# Patient Record
Sex: MALE | Race: WHITE | NOT HISPANIC OR LATINO | Employment: OTHER | URBAN - METROPOLITAN AREA
[De-identification: names, ages, dates, MRNs, and addresses within clinical notes are randomized per-mention and may not be internally consistent; named-entity substitution may affect disease eponyms.]

---

## 2018-06-14 ENCOUNTER — APPOINTMENT (OUTPATIENT)
Dept: MRI IMAGING | Facility: HOSPITAL | Age: 73
End: 2018-06-14

## 2018-06-14 ENCOUNTER — APPOINTMENT (OUTPATIENT)
Dept: CARDIOLOGY | Facility: HOSPITAL | Age: 73
End: 2018-06-14
Attending: INTERNAL MEDICINE

## 2018-06-14 ENCOUNTER — HOSPITAL ENCOUNTER (INPATIENT)
Facility: HOSPITAL | Age: 73
LOS: 3 days | Discharge: HOME | End: 2018-06-17
Attending: EMERGENCY MEDICINE | Admitting: INTERNAL MEDICINE

## 2018-06-14 ENCOUNTER — APPOINTMENT (OUTPATIENT)
Dept: CT IMAGING | Facility: HOSPITAL | Age: 73
End: 2018-06-14

## 2018-06-14 DIAGNOSIS — R77.8 ELEVATED TROPONIN: ICD-10-CM

## 2018-06-14 DIAGNOSIS — G45.9 TRANSIENT CEREBRAL ISCHEMIA, UNSPECIFIED TYPE: Primary | ICD-10-CM

## 2018-06-14 PROBLEM — I10 ESSENTIAL HYPERTENSION: Status: ACTIVE | Noted: 2018-06-14

## 2018-06-14 PROBLEM — I50.9 CHRONIC HEART FAILURE (HCC): Status: ACTIVE | Noted: 2018-06-14

## 2018-06-14 PROBLEM — R60.0 LOCALIZED EDEMA: Status: ACTIVE | Noted: 2018-06-14

## 2018-06-14 LAB
ALBUMIN SERPL-MCNC: 4.5 G/DL (ref 3.5–5.2)
ALBUMIN/GLOB SERPL: 2 G/DL
ALP SERPL-CCNC: 80 U/L (ref 39–117)
ALT SERPL W P-5'-P-CCNC: 16 U/L (ref 1–41)
ANION GAP SERPL CALCULATED.3IONS-SCNC: 12.7 MMOL/L
AORTIC DIMENSIONLESS INDEX: 0.8 (DI)
AST SERPL-CCNC: 15 U/L (ref 1–40)
BACTERIA UR QL AUTO: NORMAL /HPF
BASOPHILS # BLD AUTO: 0.02 10*3/MM3 (ref 0–0.2)
BASOPHILS NFR BLD AUTO: 0.3 % (ref 0–1.5)
BH CV ECHO MEAS - ACS: 2.2 CM
BH CV ECHO MEAS - AO MAX PG (FULL): 10.5 MMHG
BH CV ECHO MEAS - AO MAX PG: 22.5 MMHG
BH CV ECHO MEAS - AO MEAN PG (FULL): 2 MMHG
BH CV ECHO MEAS - AO MEAN PG: 8 MMHG
BH CV ECHO MEAS - AO ROOT AREA (BSA CORRECTED): 1.6
BH CV ECHO MEAS - AO ROOT AREA: 10.8 CM^2
BH CV ECHO MEAS - AO ROOT DIAM: 3.7 CM
BH CV ECHO MEAS - AO V2 MAX: 237 CM/SEC
BH CV ECHO MEAS - AO V2 MEAN: 122 CM/SEC
BH CV ECHO MEAS - AO V2 VTI: 39.4 CM
BH CV ECHO MEAS - AVA(I,A): 2.8 CM^2
BH CV ECHO MEAS - AVA(I,D): 2.8 CM^2
BH CV ECHO MEAS - AVA(V,A): 2.5 CM^2
BH CV ECHO MEAS - AVA(V,D): 2.5 CM^2
BH CV ECHO MEAS - BSA(HAYCOCK): 2.5 M^2
BH CV ECHO MEAS - BSA: 2.4 M^2
BH CV ECHO MEAS - BZI_BMI: 42.4 KILOGRAMS/M^2
BH CV ECHO MEAS - BZI_METRIC_HEIGHT: 172.7 CM
BH CV ECHO MEAS - BZI_METRIC_WEIGHT: 126.6 KG
BH CV ECHO MEAS - CONTRAST EF (2CH): 72.6 ML/M^2
BH CV ECHO MEAS - CONTRAST EF 4CH: 73.7 ML/M^2
BH CV ECHO MEAS - EDV(CUBED): 195.1 ML
BH CV ECHO MEAS - EDV(MOD-SP2): 135 ML
BH CV ECHO MEAS - EDV(MOD-SP4): 99 ML
BH CV ECHO MEAS - EDV(TEICH): 166.6 ML
BH CV ECHO MEAS - EF(CUBED): 78 %
BH CV ECHO MEAS - EF(MOD-BP): 72 %
BH CV ECHO MEAS - EF(MOD-SP2): 72.6 %
BH CV ECHO MEAS - EF(MOD-SP4): 73.7 %
BH CV ECHO MEAS - EF(TEICH): 69.5 %
BH CV ECHO MEAS - ESV(CUBED): 42.9 ML
BH CV ECHO MEAS - ESV(MOD-SP2): 37 ML
BH CV ECHO MEAS - ESV(MOD-SP4): 26 ML
BH CV ECHO MEAS - ESV(TEICH): 50.9 ML
BH CV ECHO MEAS - FS: 39.7 %
BH CV ECHO MEAS - IVS/LVPW: 1
BH CV ECHO MEAS - IVSD: 1.4 CM
BH CV ECHO MEAS - LAT PEAK E' VEL: 7 CM/SEC
BH CV ECHO MEAS - LV DIASTOLIC VOL/BSA (35-75): 42 ML/M^2
BH CV ECHO MEAS - LV MASS(C)D: 367.5 GRAMS
BH CV ECHO MEAS - LV MASS(C)DI: 156.1 GRAMS/M^2
BH CV ECHO MEAS - LV MAX PG: 12 MMHG
BH CV ECHO MEAS - LV MEAN PG: 6 MMHG
BH CV ECHO MEAS - LV SYSTOLIC VOL/BSA (12-30): 11 ML/M^2
BH CV ECHO MEAS - LV V1 MAX: 173 CM/SEC
BH CV ECHO MEAS - LV V1 MEAN: 109 CM/SEC
BH CV ECHO MEAS - LV V1 VTI: 32.4 CM
BH CV ECHO MEAS - LVIDD: 5.8 CM
BH CV ECHO MEAS - LVIDS: 3.5 CM
BH CV ECHO MEAS - LVLD AP2: 9.7 CM
BH CV ECHO MEAS - LVLD AP4: 8 CM
BH CV ECHO MEAS - LVLS AP2: 8 CM
BH CV ECHO MEAS - LVLS AP4: 6.3 CM
BH CV ECHO MEAS - LVOT AREA (M): 3.5 CM^2
BH CV ECHO MEAS - LVOT AREA: 3.5 CM^2
BH CV ECHO MEAS - LVOT DIAM: 2.1 CM
BH CV ECHO MEAS - LVPWD: 1.4 CM
BH CV ECHO MEAS - MED PEAK E' VEL: 5 CM/SEC
BH CV ECHO MEAS - MV A DUR: 0.18 SEC
BH CV ECHO MEAS - MV A MAX VEL: 118 CM/SEC
BH CV ECHO MEAS - MV DEC SLOPE: 274 CM/SEC^2
BH CV ECHO MEAS - MV DEC TIME: 0.28 SEC
BH CV ECHO MEAS - MV E MAX VEL: 62.6 CM/SEC
BH CV ECHO MEAS - MV E/A: 0.53
BH CV ECHO MEAS - MV MAX PG: 7 MMHG
BH CV ECHO MEAS - MV MEAN PG: 2 MMHG
BH CV ECHO MEAS - MV P1/2T MAX VEL: 93.7 CM/SEC
BH CV ECHO MEAS - MV P1/2T: 100.2 MSEC
BH CV ECHO MEAS - MV V2 MAX: 132 CM/SEC
BH CV ECHO MEAS - MV V2 MEAN: 64.7 CM/SEC
BH CV ECHO MEAS - MV V2 VTI: 39.6 CM
BH CV ECHO MEAS - MVA P1/2T LCG: 2.3 CM^2
BH CV ECHO MEAS - MVA(P1/2T): 2.2 CM^2
BH CV ECHO MEAS - MVA(VTI): 2.8 CM^2
BH CV ECHO MEAS - PA ACC TIME: 0.13 SEC
BH CV ECHO MEAS - PA MAX PG (FULL): 0.07 MMHG
BH CV ECHO MEAS - PA MAX PG: 3 MMHG
BH CV ECHO MEAS - PA PR(ACCEL): 20.5 MMHG
BH CV ECHO MEAS - PA V2 MAX: 86.9 CM/SEC
BH CV ECHO MEAS - PVA(V,A): 2.8 CM^2
BH CV ECHO MEAS - PVA(V,D): 2.8 CM^2
BH CV ECHO MEAS - QP/QS: 0.52
BH CV ECHO MEAS - RAP SYSTOLE: 8 MMHG
BH CV ECHO MEAS - RV MAX PG: 3 MMHG
BH CV ECHO MEAS - RV MEAN PG: 1 MMHG
BH CV ECHO MEAS - RV V1 MAX: 85.9 CM/SEC
BH CV ECHO MEAS - RV V1 MEAN: 55.8 CM/SEC
BH CV ECHO MEAS - RV V1 VTI: 20.5 CM
BH CV ECHO MEAS - RVOT AREA: 2.8 CM^2
BH CV ECHO MEAS - RVOT DIAM: 1.9 CM
BH CV ECHO MEAS - RVSP: 38 MMHG
BH CV ECHO MEAS - SI(AO): 179.9 ML/M^2
BH CV ECHO MEAS - SI(CUBED): 64.7 ML/M^2
BH CV ECHO MEAS - SI(LVOT): 47.7 ML/M^2
BH CV ECHO MEAS - SI(MOD-SP2): 41.6 ML/M^2
BH CV ECHO MEAS - SI(MOD-SP4): 31 ML/M^2
BH CV ECHO MEAS - SI(TEICH): 49.1 ML/M^2
BH CV ECHO MEAS - SV(AO): 423.6 ML
BH CV ECHO MEAS - SV(CUBED): 152.2 ML
BH CV ECHO MEAS - SV(LVOT): 112.2 ML
BH CV ECHO MEAS - SV(MOD-SP2): 98 ML
BH CV ECHO MEAS - SV(MOD-SP4): 73 ML
BH CV ECHO MEAS - SV(RVOT): 58.1 ML
BH CV ECHO MEAS - SV(TEICH): 115.7 ML
BH CV ECHO MEAS - TAPSE (>1.6): 2.2 CM2
BH CV ECHO MEAS - TR MAX VEL: 273 CM/SEC
BH CV ECHO MEASUREMENTS AVERAGE E/E' RATIO: 10.43
BH CV LOW VAS LEFT SAPHENOFEMORAL JUNCTION SPONT: 1
BH CV LOW VAS RIGHT MID FEMORAL SPONT: 1
BH CV LOW VAS RIGHT POPLITEAL SPONT: 1
BH CV LOWER VASCULAR LEFT COMMON FEMORAL AUGMENT: NORMAL
BH CV LOWER VASCULAR LEFT COMMON FEMORAL COMPETENT: NORMAL
BH CV LOWER VASCULAR LEFT COMMON FEMORAL COMPRESS: NORMAL
BH CV LOWER VASCULAR LEFT COMMON FEMORAL PHASIC: NORMAL
BH CV LOWER VASCULAR LEFT COMMON FEMORAL SPONT: NORMAL
BH CV LOWER VASCULAR LEFT DISTAL FEMORAL COMPRESS: NORMAL
BH CV LOWER VASCULAR LEFT GASTRONEMIUS COMPRESS: NORMAL
BH CV LOWER VASCULAR LEFT GREATER SAPH AK COMPRESS: NORMAL
BH CV LOWER VASCULAR LEFT GREATER SAPH BK COMPRESS: NORMAL
BH CV LOWER VASCULAR LEFT LESSER SAPH COMPRESS: NORMAL
BH CV LOWER VASCULAR LEFT MID FEMORAL AUGMENT: NORMAL
BH CV LOWER VASCULAR LEFT MID FEMORAL COMPETENT: NORMAL
BH CV LOWER VASCULAR LEFT MID FEMORAL COMPRESS: NORMAL
BH CV LOWER VASCULAR LEFT MID FEMORAL PHASIC: NORMAL
BH CV LOWER VASCULAR LEFT MID FEMORAL SPONT: NORMAL
BH CV LOWER VASCULAR LEFT PERONEAL COMPRESS: NORMAL
BH CV LOWER VASCULAR LEFT POPLITEAL AUGMENT: NORMAL
BH CV LOWER VASCULAR LEFT POPLITEAL COMPETENT: NORMAL
BH CV LOWER VASCULAR LEFT POPLITEAL COMPRESS: NORMAL
BH CV LOWER VASCULAR LEFT POPLITEAL PHASIC: NORMAL
BH CV LOWER VASCULAR LEFT POPLITEAL SPONT: NORMAL
BH CV LOWER VASCULAR LEFT POSTERIOR TIBIAL COMPRESS: NORMAL
BH CV LOWER VASCULAR LEFT PROXIMAL FEMORAL COMPRESS: NORMAL
BH CV LOWER VASCULAR LEFT SAPHENOFEMORAL JUNCTION AUGMENT: NORMAL
BH CV LOWER VASCULAR LEFT SAPHENOFEMORAL JUNCTION COMPETENT: NORMAL
BH CV LOWER VASCULAR LEFT SAPHENOFEMORAL JUNCTION COMPRESS: NORMAL
BH CV LOWER VASCULAR LEFT SAPHENOFEMORAL JUNCTION PHASIC: NORMAL
BH CV LOWER VASCULAR LEFT SAPHENOFEMORAL JUNCTION SPONT: NORMAL
BH CV LOWER VASCULAR RIGHT COMMON FEMORAL AUGMENT: NORMAL
BH CV LOWER VASCULAR RIGHT COMMON FEMORAL COMPETENT: NORMAL
BH CV LOWER VASCULAR RIGHT COMMON FEMORAL COMPRESS: NORMAL
BH CV LOWER VASCULAR RIGHT COMMON FEMORAL PHASIC: NORMAL
BH CV LOWER VASCULAR RIGHT COMMON FEMORAL SPONT: NORMAL
BH CV LOWER VASCULAR RIGHT DISTAL FEMORAL COMPRESS: NORMAL
BH CV LOWER VASCULAR RIGHT GASTRONEMIUS COMPRESS: NORMAL
BH CV LOWER VASCULAR RIGHT GREATER SAPH AK COMPRESS: NORMAL
BH CV LOWER VASCULAR RIGHT GREATER SAPH BK COMPRESS: NORMAL
BH CV LOWER VASCULAR RIGHT LESSER SAPH COMPRESS: NORMAL
BH CV LOWER VASCULAR RIGHT MID FEMORAL AUGMENT: NORMAL
BH CV LOWER VASCULAR RIGHT MID FEMORAL COMPETENT: NORMAL
BH CV LOWER VASCULAR RIGHT MID FEMORAL COMPRESS: NORMAL
BH CV LOWER VASCULAR RIGHT MID FEMORAL PHASIC: NORMAL
BH CV LOWER VASCULAR RIGHT MID FEMORAL SPONT: NORMAL
BH CV LOWER VASCULAR RIGHT PERONEAL COMPRESS: NORMAL
BH CV LOWER VASCULAR RIGHT POPLITEAL AUGMENT: NORMAL
BH CV LOWER VASCULAR RIGHT POPLITEAL COMPETENT: NORMAL
BH CV LOWER VASCULAR RIGHT POPLITEAL COMPRESS: NORMAL
BH CV LOWER VASCULAR RIGHT POPLITEAL PHASIC: NORMAL
BH CV LOWER VASCULAR RIGHT POPLITEAL SPONT: NORMAL
BH CV LOWER VASCULAR RIGHT POSTERIOR TIBIAL COMPRESS: NORMAL
BH CV LOWER VASCULAR RIGHT PROXIMAL FEMORAL COMPRESS: NORMAL
BH CV LOWER VASCULAR RIGHT SAPHENOFEMORAL JUNCTION AUGMENT: NORMAL
BH CV LOWER VASCULAR RIGHT SAPHENOFEMORAL JUNCTION COMPETENT: NORMAL
BH CV LOWER VASCULAR RIGHT SAPHENOFEMORAL JUNCTION COMPRESS: NORMAL
BH CV LOWER VASCULAR RIGHT SAPHENOFEMORAL JUNCTION PHASIC: NORMAL
BH CV LOWER VASCULAR RIGHT SAPHENOFEMORAL JUNCTION SPONT: NORMAL
BH CV VAS BP RIGHT ARM: NORMAL MMHG
BH CV XLRA - RV BASE: 3.8 CM
BH CV XLRA - TDI S': 17 CM/SEC
BILIRUB SERPL-MCNC: 0.3 MG/DL (ref 0.1–1.2)
BILIRUB UR QL STRIP: NEGATIVE
BUN BLD-MCNC: 16 MG/DL (ref 8–23)
BUN/CREAT SERPL: 13.4 (ref 7–25)
CALCIUM SPEC-SCNC: 9.3 MG/DL (ref 8.6–10.5)
CHLORIDE SERPL-SCNC: 102 MMOL/L (ref 98–107)
CLARITY UR: CLEAR
CO2 SERPL-SCNC: 28.3 MMOL/L (ref 22–29)
COLOR UR: YELLOW
CREAT BLD-MCNC: 1.19 MG/DL (ref 0.76–1.27)
DEPRECATED RDW RBC AUTO: 52.5 FL (ref 37–54)
EOSINOPHIL # BLD AUTO: 0.29 10*3/MM3 (ref 0–0.7)
EOSINOPHIL NFR BLD AUTO: 4.2 % (ref 0.3–6.2)
ERYTHROCYTE [DISTWIDTH] IN BLOOD BY AUTOMATED COUNT: 15.1 % (ref 11.5–14.5)
GFR SERPL CREATININE-BSD FRML MDRD: 60 ML/MIN/1.73
GLOBULIN UR ELPH-MCNC: 2.2 GM/DL
GLUCOSE BLD-MCNC: 107 MG/DL (ref 65–99)
GLUCOSE BLDC GLUCOMTR-MCNC: 96 MG/DL (ref 70–130)
GLUCOSE UR STRIP-MCNC: NEGATIVE MG/DL
HCT VFR BLD AUTO: 49.3 % (ref 40.4–52.2)
HGB BLD-MCNC: 16.2 G/DL (ref 13.7–17.6)
HGB UR QL STRIP.AUTO: NEGATIVE
HYALINE CASTS UR QL AUTO: NORMAL /LPF
IMM GRANULOCYTES # BLD: 0.02 10*3/MM3 (ref 0–0.03)
IMM GRANULOCYTES NFR BLD: 0.3 % (ref 0–0.5)
INR PPP: 0.86 (ref 0.9–1.1)
KETONES UR QL STRIP: NEGATIVE
LEFT ATRIUM VOLUME INDEX: 30.9 ML/M2
LEUKOCYTE ESTERASE UR QL STRIP.AUTO: NEGATIVE
LIPASE SERPL-CCNC: 46 U/L (ref 13–60)
LYMPHOCYTES # BLD AUTO: 1.66 10*3/MM3 (ref 0.9–4.8)
LYMPHOCYTES NFR BLD AUTO: 23.9 % (ref 19.6–45.3)
MAXIMAL PREDICTED HEART RATE: 150 BPM
MCH RBC QN AUTO: 31.2 PG (ref 27–32.7)
MCHC RBC AUTO-ENTMCNC: 32.9 G/DL (ref 32.6–36.4)
MCV RBC AUTO: 94.8 FL (ref 79.8–96.2)
MONOCYTES # BLD AUTO: 0.54 10*3/MM3 (ref 0.2–1.2)
MONOCYTES NFR BLD AUTO: 7.8 % (ref 5–12)
NEUTROPHILS # BLD AUTO: 4.41 10*3/MM3 (ref 1.9–8.1)
NEUTROPHILS NFR BLD AUTO: 63.5 % (ref 42.7–76)
NITRITE UR QL STRIP: NEGATIVE
PH UR STRIP.AUTO: 7 [PH] (ref 5–8)
PLATELET # BLD AUTO: 177 10*3/MM3 (ref 140–500)
PMV BLD AUTO: 10.3 FL (ref 6–12)
POTASSIUM BLD-SCNC: 3.9 MMOL/L (ref 3.5–5.2)
PROT SERPL-MCNC: 6.7 G/DL (ref 6–8.5)
PROT UR QL STRIP: ABNORMAL
PROTHROMBIN TIME: 11.6 SECONDS (ref 11.7–14.2)
RBC # BLD AUTO: 5.2 10*6/MM3 (ref 4.6–6)
RBC # UR: NORMAL /HPF
REF LAB TEST METHOD: NORMAL
SODIUM BLD-SCNC: 143 MMOL/L (ref 136–145)
SP GR UR STRIP: 1.02 (ref 1–1.03)
SQUAMOUS #/AREA URNS HPF: NORMAL /HPF
STRESS TARGET HR: 128 BPM
TROPONIN T SERPL-MCNC: 0.03 NG/ML (ref 0–0.03)
TROPONIN T SERPL-MCNC: 0.04 NG/ML (ref 0–0.03)
UROBILINOGEN UR QL STRIP: ABNORMAL
WBC NRBC COR # BLD: 6.94 10*3/MM3 (ref 4.5–10.7)
WBC UR QL AUTO: NORMAL /HPF

## 2018-06-14 PROCEDURE — 80053 COMPREHEN METABOLIC PANEL: CPT | Performed by: EMERGENCY MEDICINE

## 2018-06-14 PROCEDURE — 70544 MR ANGIOGRAPHY HEAD W/O DYE: CPT

## 2018-06-14 PROCEDURE — 93970 EXTREMITY STUDY: CPT

## 2018-06-14 PROCEDURE — A9577 INJ MULTIHANCE: HCPCS | Performed by: INTERNAL MEDICINE

## 2018-06-14 PROCEDURE — 84484 ASSAY OF TROPONIN QUANT: CPT | Performed by: EMERGENCY MEDICINE

## 2018-06-14 PROCEDURE — 84484 ASSAY OF TROPONIN QUANT: CPT | Performed by: INTERNAL MEDICINE

## 2018-06-14 PROCEDURE — 99285 EMERGENCY DEPT VISIT HI MDM: CPT

## 2018-06-14 PROCEDURE — 70553 MRI BRAIN STEM W/O & W/DYE: CPT

## 2018-06-14 PROCEDURE — 70450 CT HEAD/BRAIN W/O DYE: CPT

## 2018-06-14 PROCEDURE — 85610 PROTHROMBIN TIME: CPT | Performed by: EMERGENCY MEDICINE

## 2018-06-14 PROCEDURE — 81001 URINALYSIS AUTO W/SCOPE: CPT | Performed by: EMERGENCY MEDICINE

## 2018-06-14 PROCEDURE — 93306 TTE W/DOPPLER COMPLETE: CPT

## 2018-06-14 PROCEDURE — 0 GADOBENATE DIMEGLUMINE 529 MG/ML SOLUTION: Performed by: INTERNAL MEDICINE

## 2018-06-14 PROCEDURE — 93010 ELECTROCARDIOGRAM REPORT: CPT | Performed by: INTERNAL MEDICINE

## 2018-06-14 PROCEDURE — 93306 TTE W/DOPPLER COMPLETE: CPT | Performed by: INTERNAL MEDICINE

## 2018-06-14 PROCEDURE — 93005 ELECTROCARDIOGRAM TRACING: CPT | Performed by: EMERGENCY MEDICINE

## 2018-06-14 PROCEDURE — 82962 GLUCOSE BLOOD TEST: CPT

## 2018-06-14 PROCEDURE — 25010000002 PERFLUTREN (DEFINITY) 8.476 MG IN SODIUM CHLORIDE 0.9 % 10 ML INJECTION: Performed by: INTERNAL MEDICINE

## 2018-06-14 PROCEDURE — 85025 COMPLETE CBC W/AUTO DIFF WBC: CPT | Performed by: EMERGENCY MEDICINE

## 2018-06-14 PROCEDURE — 70549 MR ANGIOGRAPH NECK W/O&W/DYE: CPT

## 2018-06-14 PROCEDURE — 83690 ASSAY OF LIPASE: CPT | Performed by: EMERGENCY MEDICINE

## 2018-06-14 RX ORDER — ONDANSETRON 2 MG/ML
4 INJECTION INTRAMUSCULAR; INTRAVENOUS EVERY 6 HOURS PRN
Status: DISCONTINUED | OUTPATIENT
Start: 2018-06-14 | End: 2018-06-17 | Stop reason: HOSPADM

## 2018-06-14 RX ORDER — ALBUTEROL SULFATE 2.5 MG/3ML
2.5 SOLUTION RESPIRATORY (INHALATION) EVERY 6 HOURS PRN
Status: DISCONTINUED | OUTPATIENT
Start: 2018-06-14 | End: 2018-06-17 | Stop reason: HOSPADM

## 2018-06-14 RX ORDER — FUROSEMIDE 40 MG/1
40 TABLET ORAL 2 TIMES DAILY
COMMUNITY

## 2018-06-14 RX ORDER — TRAZODONE HYDROCHLORIDE 50 MG/1
50 TABLET ORAL NIGHTLY
Status: DISCONTINUED | OUTPATIENT
Start: 2018-06-14 | End: 2018-06-17 | Stop reason: HOSPADM

## 2018-06-14 RX ORDER — ALUMINA, MAGNESIA, AND SIMETHICONE 2400; 2400; 240 MG/30ML; MG/30ML; MG/30ML
7.5 SUSPENSION ORAL EVERY 4 HOURS PRN
Status: DISCONTINUED | OUTPATIENT
Start: 2018-06-14 | End: 2018-06-17 | Stop reason: HOSPADM

## 2018-06-14 RX ORDER — ALPRAZOLAM 0.5 MG/1
0.5 TABLET ORAL NIGHTLY PRN
Status: DISCONTINUED | OUTPATIENT
Start: 2018-06-14 | End: 2018-06-17 | Stop reason: HOSPADM

## 2018-06-14 RX ORDER — ASPIRIN 325 MG
325 TABLET ORAL DAILY
Status: DISCONTINUED | OUTPATIENT
Start: 2018-06-14 | End: 2018-06-15

## 2018-06-14 RX ORDER — ASPIRIN 300 MG/1
300 SUPPOSITORY RECTAL DAILY
Status: DISCONTINUED | OUTPATIENT
Start: 2018-06-14 | End: 2018-06-15

## 2018-06-14 RX ORDER — LEVOTHYROXINE SODIUM 0.15 MG/1
300 TABLET ORAL DAILY
Status: DISCONTINUED | OUTPATIENT
Start: 2018-06-14 | End: 2018-06-17 | Stop reason: HOSPADM

## 2018-06-14 RX ORDER — ATORVASTATIN CALCIUM 80 MG/1
80 TABLET, FILM COATED ORAL NIGHTLY
Status: DISCONTINUED | OUTPATIENT
Start: 2018-06-14 | End: 2018-06-15

## 2018-06-14 RX ORDER — HYDRALAZINE HYDROCHLORIDE 100 MG/1
100 TABLET, FILM COATED ORAL 2 TIMES DAILY
COMMUNITY

## 2018-06-14 RX ORDER — LABETALOL HYDROCHLORIDE 5 MG/ML
20 INJECTION, SOLUTION INTRAVENOUS EVERY 6 HOURS PRN
Status: DISCONTINUED | OUTPATIENT
Start: 2018-06-14 | End: 2018-06-17 | Stop reason: HOSPADM

## 2018-06-14 RX ORDER — BISACODYL 10 MG
10 SUPPOSITORY, RECTAL RECTAL DAILY PRN
Status: DISCONTINUED | OUTPATIENT
Start: 2018-06-14 | End: 2018-06-17 | Stop reason: HOSPADM

## 2018-06-14 RX ORDER — LEVOTHYROXINE SODIUM 300 UG/1
300 TABLET ORAL DAILY
COMMUNITY

## 2018-06-14 RX ORDER — AMLODIPINE BESYLATE 5 MG/1
5 TABLET ORAL DAILY
COMMUNITY

## 2018-06-14 RX ORDER — SODIUM CHLORIDE 0.9 % (FLUSH) 0.9 %
10 SYRINGE (ML) INJECTION AS NEEDED
Status: DISCONTINUED | OUTPATIENT
Start: 2018-06-14 | End: 2018-06-17 | Stop reason: HOSPADM

## 2018-06-14 RX ORDER — CARVEDILOL 3.12 MG/1
3.12 TABLET ORAL 2 TIMES DAILY WITH MEALS
Status: DISCONTINUED | OUTPATIENT
Start: 2018-06-14 | End: 2018-06-16

## 2018-06-14 RX ORDER — DOCUSATE SODIUM 100 MG/1
100 CAPSULE, LIQUID FILLED ORAL 2 TIMES DAILY PRN
Status: DISCONTINUED | OUTPATIENT
Start: 2018-06-14 | End: 2018-06-17 | Stop reason: HOSPADM

## 2018-06-14 RX ORDER — ALBUTEROL SULFATE 90 UG/1
2 AEROSOL, METERED RESPIRATORY (INHALATION) EVERY 4 HOURS PRN
COMMUNITY

## 2018-06-14 RX ORDER — LOSARTAN POTASSIUM 100 MG/1
100 TABLET ORAL DAILY
COMMUNITY

## 2018-06-14 RX ORDER — SODIUM CHLORIDE 0.9 % (FLUSH) 0.9 %
1-10 SYRINGE (ML) INJECTION AS NEEDED
Status: DISCONTINUED | OUTPATIENT
Start: 2018-06-14 | End: 2018-06-17 | Stop reason: HOSPADM

## 2018-06-14 RX ORDER — TRAMADOL HYDROCHLORIDE 50 MG/1
50 TABLET ORAL EVERY 4 HOURS PRN
COMMUNITY
End: 2018-06-17 | Stop reason: HOSPADM

## 2018-06-14 RX ORDER — CARVEDILOL 3.12 MG/1
3.12 TABLET ORAL 2 TIMES DAILY WITH MEALS
COMMUNITY
End: 2018-06-17 | Stop reason: HOSPADM

## 2018-06-14 RX ORDER — ASPIRIN 81 MG/1
324 TABLET, CHEWABLE ORAL ONCE
Status: COMPLETED | OUTPATIENT
Start: 2018-06-14 | End: 2018-06-14

## 2018-06-14 RX ORDER — TRAZODONE HYDROCHLORIDE 50 MG/1
50 TABLET ORAL NIGHTLY
COMMUNITY

## 2018-06-14 RX ORDER — ALPRAZOLAM 0.5 MG/1
0.5 TABLET ORAL NIGHTLY PRN
COMMUNITY

## 2018-06-14 RX ADMIN — GADOBENATE DIMEGLUMINE 20 ML: 529 INJECTION, SOLUTION INTRAVENOUS at 21:55

## 2018-06-14 RX ADMIN — CARVEDILOL 3.12 MG: 3.12 TABLET, FILM COATED ORAL at 23:16

## 2018-06-14 RX ADMIN — ATORVASTATIN CALCIUM 80 MG: 80 TABLET, FILM COATED ORAL at 23:16

## 2018-06-14 RX ADMIN — LEVOTHYROXINE SODIUM 300 MCG: 150 TABLET ORAL at 23:16

## 2018-06-14 RX ADMIN — ASPIRIN 324 MG: 81 TABLET, CHEWABLE ORAL at 13:15

## 2018-06-14 RX ADMIN — ALPRAZOLAM 0.5 MG: 0.5 TABLET ORAL at 23:16

## 2018-06-14 RX ADMIN — TRAZODONE HYDROCHLORIDE 50 MG: 50 TABLET ORAL at 23:16

## 2018-06-14 RX ADMIN — PERFLUTREN 3 ML: 6.52 INJECTION, SUSPENSION INTRAVENOUS at 20:22

## 2018-06-14 RX ADMIN — SERTRALINE 50 MG: 50 TABLET, FILM COATED ORAL at 23:16

## 2018-06-14 NOTE — ED NOTES
Pt was ambulated with cane and assist x1. Pt felt weak but appeared stable.     Bret Chou  06/14/18 0218

## 2018-06-14 NOTE — ED PROVIDER NOTES
" EMERGENCY DEPARTMENT ENCOUNTER    CHIEF COMPLAINT  Chief Complaint: Generalized weakness  History given by: Patient  History limited by: None  Room Number: 41/41  PMD: Provider Not In System      HPI:  Pt is a 72 y.o. male who presents complaining of generalized weakness since 0800 when pt noticed \"pressure\" in his L sided head and tingling in the L arm that has since resolved. Pt also c/o mild confusion, spouse. Pt states he woke up at 0700 and was at baseline. Pt hx of MI and stroke. Pt is on Clonidine, Hydralazine, and Losartan.  Patient denies any alcohol or any illegal drugs.  Currently the patient denies any pain or shortness of breath.    Duration: Since 0800  Onset: Gradual  Timing: Constant  Quality: Generalized weakness and \"pressure\" in L sided head  Intensity/Severity: Moderate  Progression: Unchanged, but \"pressure\" in L sided head and tingling in L arm has resolved  Associated Symptoms: \"Pressure\" in L sided head, tingling in L arm, and mild confusion  Previous Episodes: Pt hx of MI and stroke.  Treatment before arrival: None    PAST MEDICAL HISTORY  Active Ambulatory Problems     Diagnosis Date Noted   • No Active Ambulatory Problems     Resolved Ambulatory Problems     Diagnosis Date Noted   • No Resolved Ambulatory Problems     Past Medical History:   Diagnosis Date   • Cancer    • CHF (congestive heart failure)    • Coronary artery disease    • Disease of thyroid gland    • Hyperlipidemia    • Hypertension    • Injury of back    • Melanoma    • Myocardial infarction    • Renal disorder    • Stroke        PAST SURGICAL HISTORY  Past Surgical History:   Procedure Laterality Date   • JOINT REPLACEMENT     • LYMPHADENECTOMY         FAMILY HISTORY  History reviewed. No pertinent family history.    SOCIAL HISTORY  Social History     Social History   • Marital status:      Spouse name: N/A   • Number of children: N/A   • Years of education: N/A     Occupational History   • Not on file.     Social " "History Main Topics   • Smoking status: Current Every Day Smoker   • Smokeless tobacco: Never Used   • Alcohol use Yes   • Drug use: No   • Sexual activity: Defer     Other Topics Concern   • Not on file     Social History Narrative   • No narrative on file       ALLERGIES  Penicillins    REVIEW OF SYSTEMS  Review of Systems   Constitutional: Negative for activity change, appetite change and fever.   HENT: Negative for congestion and sore throat.         \"Pressure\" in L sided head.   Eyes: Negative.    Respiratory: Negative for cough and shortness of breath.    Cardiovascular: Negative for chest pain and leg swelling.   Gastrointestinal: Negative for abdominal pain, diarrhea and vomiting.   Endocrine: Negative.    Genitourinary: Negative for decreased urine volume and dysuria.   Musculoskeletal: Negative for neck pain.   Skin: Negative for rash and wound.   Allergic/Immunologic: Negative.    Neurological: Positive for weakness (Generalized). Negative for numbness and headaches.        Tingling in L arm.   Hematological: Negative.    Psychiatric/Behavioral: Positive for confusion (mild).   All other systems reviewed and are negative.      PHYSICAL EXAM  ED Triage Vitals [06/14/18 0919]   Temp Heart Rate Resp BP SpO2   98.1 °F (36.7 °C) 63 18 155/93 96 %      Temp src Heart Rate Source Patient Position BP Location FiO2 (%)   Tympanic Monitor -- -- --       Physical Exam   Constitutional: He is oriented to person, place, and time. No distress.   Patient is chronically ill-appearing   HENT:   Head: Normocephalic and atraumatic.   Eyes: EOM are normal. Pupils are equal, round, and reactive to light.   Neck: Normal range of motion. Neck supple.   Cardiovascular: Normal rate, regular rhythm and normal heart sounds.    O2 97% on room air.   Pulmonary/Chest: Effort normal and breath sounds normal. No respiratory distress.   Lungs CTAB   Abdominal: Soft. There is no tenderness. There is no rebound and no guarding. "   Musculoskeletal: Normal range of motion. He exhibits no edema.   Neurological: He is alert and oriented to person, place, and time. He has normal sensation, normal strength and intact cranial nerves.   Skin: Skin is warm and dry.   Psychiatric: Mood and affect normal.   Nursing note and vitals reviewed.      LAB RESULTS  Lab Results (last 24 hours)     Procedure Component Value Units Date/Time    CBC & Differential [[id number]] Collected:  06/14/18 1047    Specimen:  Blood Updated:  06/14/18 1105    Narrative:       The following orders were created for panel order CBC & Differential.  Procedure                               Abnormality         Status                     ---------                               -----------         ------                     CBC Auto Differential[[id number]]        Abnormal            Final result                 Please view results for these tests on the individual orders.    Comprehensive Metabolic Panel [[id number]]  (Abnormal) Collected:  06/14/18 1047    Specimen:  Blood Updated:  06/14/18 1121     Glucose 107 (H) mg/dL      BUN 16 mg/dL      Creatinine 1.19 mg/dL      Sodium 143 mmol/L      Potassium 3.9 mmol/L      Chloride 102 mmol/L      CO2 28.3 mmol/L      Calcium 9.3 mg/dL      Total Protein 6.7 g/dL      Albumin 4.50 g/dL      ALT (SGPT) 16 U/L      AST (SGOT) 15 U/L      Alkaline Phosphatase 80 U/L      Total Bilirubin 0.3 mg/dL      eGFR Non African Amer 60 (L) mL/min/1.73      Globulin 2.2 gm/dL      A/G Ratio 2.0 g/dL      BUN/Creatinine Ratio 13.4     Anion Gap 12.7 mmol/L     Protime-INR [[id number]]  (Abnormal) Collected:  06/14/18 1047    Specimen:  Blood Updated:  06/14/18 1120     Protime 11.6 (L) Seconds      INR 0.86 (L)    Troponin [[id number]]  (Abnormal) Collected:  06/14/18 1047    Specimen:  Blood Updated:  06/14/18 1123     Troponin T 0.035 (H) ng/mL     Narrative:       Troponin T Reference Ranges:  Less than 0.03 ng/mL:    Negative for AMI  0.03 to 0.09  ng/mL:      Indeterminant for AMI  Greater than 0.09 ng/mL: Positive for AMI    Lipase [[id number]]  (Normal) Collected:  06/14/18 1047    Specimen:  Blood Updated:  06/14/18 1121     Lipase 46 U/L     CBC Auto Differential [[id number]]  (Abnormal) Collected:  06/14/18 1047    Specimen:  Blood Updated:  06/14/18 1105     WBC 6.94 10*3/mm3      RBC 5.20 10*6/mm3      Hemoglobin 16.2 g/dL      Hematocrit 49.3 %      MCV 94.8 fL      MCH 31.2 pg      MCHC 32.9 g/dL      RDW 15.1 (H) %      RDW-SD 52.5 fl      MPV 10.3 fL      Platelets 177 10*3/mm3      Neutrophil % 63.5 %      Lymphocyte % 23.9 %      Monocyte % 7.8 %      Eosinophil % 4.2 %      Basophil % 0.3 %      Immature Grans % 0.3 %      Neutrophils, Absolute 4.41 10*3/mm3      Lymphocytes, Absolute 1.66 10*3/mm3      Monocytes, Absolute 0.54 10*3/mm3      Eosinophils, Absolute 0.29 10*3/mm3      Basophils, Absolute 0.02 10*3/mm3      Immature Grans, Absolute 0.02 10*3/mm3     Urinalysis With / Microscopic If Indicated (No Culture) - Urine, Clean Catch [[id number]]  (Abnormal) Collected:  06/14/18 1101    Specimen:  Urine from Urine, Clean Catch Updated:  06/14/18 1119     Color, UA Yellow     Appearance, UA Clear     pH, UA 7.0     Specific Gravity, UA 1.017     Glucose, UA Negative     Ketones, UA Negative     Bilirubin, UA Negative     Blood, UA Negative     Protein,  mg/dL (2+) (A)     Leuk Esterase, UA Negative     Nitrite, UA Negative     Urobilinogen, UA 0.2 E.U./dL    Urinalysis, Microscopic Only - Urine, Clean Catch [[id number]] Collected:  06/14/18 1101    Specimen:  Urine from Urine, Clean Catch Updated:  06/14/18 1119     RBC, UA 0-2 /HPF      WBC, UA 0-2 /HPF      Bacteria, UA None Seen /HPF      Squamous Epithelial Cells, UA 0-2 /HPF      Hyaline Casts, UA 0-2 /LPF      Methodology Automated Microscopy    POC Glucose Once [[id number]]  (Normal) Collected:  06/14/18 1227    Specimen:  Blood Updated:  06/14/18 1229     Glucose 96 mg/dL      Narrative:       Meter: [id number] : [id number] Effie Abreu          I ordered the above labs and reviewed the results    RADIOLOGY  CT Head Without Contrast   Preliminary Result   No evidence of acute infarction or hemorrhage. There is an   area of decreased attenuation involving the periventricular white matter   of the right frontal lobe posteriorly and superiorly suggesting a remote   infarct. Moderate vascular calcification is noted. No convincing acute   infarction is identified. There is no evidence of hemorrhage. The above   information was called to and discussed with Dr. Hernandez.               Radiation dose reduction techniques were utilized, including automated   exposure control and exposure modulation based on body size.                   I ordered the above noted radiological studies. Interpreted by radiologist. Discussed with radiologist (Dr. Caraballo). Reviewed by me in PACS.       PROCEDURES  Procedures  EKG           EKG time: 1152  Rhythm/Rate: Sinus rhythm rate 63  Narrow QRS, nml axis  ST and T waves: Diffuse non-specific changes    Interpreted Contemporaneously by me, independently viewed  No old for comparison.    PROGRESS AND CONSULTS  ED Course as of Jun 14 1421   Thu Jun 14, 2018   1309 Troponin T: (!) 0.035 [MM]   1418 Troponin T: (!) 0.035 [MM]      ED Course User Index  [MM] Tutu Hernandez MD   1044 Ordered protocol labs, EKG, and CT Head for further evaluation.    1413 Rechecked with pt, who ambulates well and is in NAD. Discussed elevated troponin and dx of TIA. Plan to admit. Pt understands and agrees with the plan, all questions answered.    1418 Discussed pt with Dr. Wilde, who agrees to admit.    MEDICAL DECISION MAKING  Results were reviewed/discussed with the patient and they were also made aware of online access. Pt also made aware that some labs, such as cultures, will not be resulted during ER visit and follow up with PMD is necessary.     MDM  Number of  Diagnoses or Management Options  Elevated troponin:   Transient cerebral ischemia, unspecified type:      Amount and/or Complexity of Data Reviewed  Clinical lab tests: ordered and reviewed (Troponin= 0.035)  Tests in the radiology section of CPT®: ordered and reviewed (CT Head shows no evidence of acute infarction or hemorrhage. There is an area of decreased attenuation involving the periventricular white matter of the right frontal lobe posteriorly and superiorly suggesting a remote infarct. Moderate vascular calcification is noted. No convincing acute infarction is identified. There is no evidence of hemorrhage.)  Tests in the medicine section of CPT®: ordered and reviewed (See procedure notes for EKG.)  Decide to obtain previous medical records or to obtain history from someone other than the patient: yes    Patient Progress  Patient progress: stable         DIAGNOSIS  Final diagnoses:   Transient cerebral ischemia, unspecified type   Elevated troponin       DISPOSITION  ADMISSION    Discussed treatment plan and reason for admission with pt/family and admitting physician.  Pt/family voiced understanding of the plan for admission for further testing/treatment as needed.     Latest Documented Vital Signs:  As of 2:21 PM  BP- (!) 223/105 HR- 70 Temp- 98.1 °F (36.7 °C) (Tympanic) O2 sat- 95%    --  Documentation assistance provided by cherie Lizama for Dr. Hernandez.  Information recorded by the scribe was done at my direction and has been verified and validated by me.     Angela Lizama  06/14/18 7801       Tutu Hernandez MD  06/14/18 6384

## 2018-06-14 NOTE — PROGRESS NOTES
Clinical Pharmacy Services: Medication History    Beverly Clayton is a 72 y.o. male presenting to [facility] for   Chief Complaint   Patient presents with   • Dizziness     left side face, hand, arm numb.  hx stroke       He  has a past medical history of Cancer; CHF (congestive heart failure); Coronary artery disease; Disease of thyroid gland; Hyperlipidemia; Hypertension; Injury of back; Melanoma; Myocardial infarction; Renal disorder; and Stroke.    Allergies as of 06/14/2018 - Reviewed 06/14/2018   Allergen Reaction Noted   • Penicillins Unknown (See Comments) 06/14/2018       Medication information was obtained from: Pharmacy, patient  Pharmacy and Phone Number: [facility] [phone]    Prior to Admission Medications     Prescriptions Last Dose Informant Patient Reported? Taking?    albuterol (PROAIR HFA) 108 (90 Base) MCG/ACT inhaler  Pharmacy Yes Yes    Inhale 2 puffs Every 4 (Four) Hours As Needed for Wheezing.    ALPRAZolam (XANAX) 0.5 MG tablet  Pharmacy Yes Yes    Take 0.5 mg by mouth At Night As Needed for Anxiety.    amLODIPine (NORVASC) 5 MG tablet  Pharmacy Yes Yes    Take 5 mg by mouth Daily.    carvedilol (COREG) 3.125 MG tablet  Pharmacy Yes Yes    Take 3.125 mg by mouth 2 (Two) Times a Day With Meals.    furosemide (LASIX) 40 MG tablet  Pharmacy Yes Yes    Take 40 mg by mouth 2 (Two) Times a Day.    hydrALAZINE (APRESOLINE) 100 MG tablet  Pharmacy Yes Yes    Take 100 mg by mouth 2 (Two) Times a Day.    levothyroxine (SYNTHROID, LEVOTHROID) 300 MCG tablet  Pharmacy Yes Yes    Take 300 mcg by mouth Daily.    losartan (COZAAR) 100 MG tablet  Pharmacy Yes Yes    Take 100 mg by mouth Daily.    sertraline (ZOLOFT) 50 MG tablet  Pharmacy Yes Yes    Take 50 mg by mouth Daily.    traMADol (ULTRAM) 50 MG tablet  Pharmacy Yes Yes    Take 50 mg by mouth Every 4 (Four) Hours As Needed for Moderate Pain .    traZODone (DESYREL) 50 MG tablet  Pharmacy Yes Yes    Take 50 mg by mouth Every Night.             Medication notes: All medications added per patient's pharmacy records. Reviewed with patient.    This medication list is complete to the best of my knowledge as of 6/14/2018    Please call if questions.    Bren Ordoñez, Medication History Technician  6/14/2018 3:38 PM

## 2018-06-14 NOTE — H&P
Name: Beverly Clayton ADMIT: 2018   : 1945  PCP: Provider Not In System    MRN: [id number] LOS: 0 days   AGE/SEX: 72 y.o. male  ROOM: 545/1     Chief Complaint   Patient presents with   • Dizziness     left side face, hand, arm numb.  hx stroke       Subjective   Mr. Clayton is a 72 y.o. male with a history of CAD and  that presents to [facility] complaining of left sided facial numbness and left are tingling. He reports that this started this morning. He had some confusion and weakness per his spouse. Reports no vision changes or altered speech. No dyspnea or difficulty swallowing. He is concerned because he previously had heart attack which started with head pressure, arm numbness, and nausea symptoms. He denies chest pressure or palpitations. He has had increased left leg swelling but no calf tenderness or claudication.            History of Present Illness    Past Medical History:   Diagnosis Date   • Cancer    • CHF (congestive heart failure)    • Coronary artery disease    • Disease of thyroid gland    • Hyperlipidemia    • Hypertension    • Injury of back    • Melanoma    • Myocardial infarction    • Renal disorder    • Stroke      Past Surgical History:   Procedure Laterality Date   • JOINT REPLACEMENT     • LYMPHADENECTOMY       History reviewed. No pertinent family history.  Social History   Substance Use Topics   • Smoking status: Current Every Day Smoker     Packs/day: 0.50   • Smokeless tobacco: Never Used   • Alcohol use Yes      Comment: couple times a week     Prescriptions Prior to Admission   Medication Sig Dispense Refill Last Dose   • albuterol (PROAIR HFA) 108 (90 Base) MCG/ACT inhaler Inhale 2 puffs Every 4 (Four) Hours As Needed for Wheezing.      • ALPRAZolam (XANAX) 0.5 MG tablet Take 0.5 mg by mouth At Night As Needed for Anxiety.      • amLODIPine (NORVASC) 5 MG tablet Take 5 mg by mouth Daily.      • carvedilol (COREG) 3.125 MG tablet Take 3.125 mg by mouth  2 (Two) Times a Day With Meals.      • furosemide (LASIX) 40 MG tablet Take 40 mg by mouth 2 (Two) Times a Day.      • hydrALAZINE (APRESOLINE) 100 MG tablet Take 100 mg by mouth 2 (Two) Times a Day.      • levothyroxine (SYNTHROID, LEVOTHROID) 300 MCG tablet Take 300 mcg by mouth Daily.      • losartan (COZAAR) 100 MG tablet Take 100 mg by mouth Daily.      • sertraline (ZOLOFT) 50 MG tablet Take 50 mg by mouth Daily.      • traMADol (ULTRAM) 50 MG tablet Take 50 mg by mouth Every 4 (Four) Hours As Needed for Moderate Pain .      • traZODone (DESYREL) 50 MG tablet Take 50 mg by mouth Every Night.        Allergies:    Allergies   Allergen Reactions   • Penicillins Unknown (See Comments)     .       Review of Systems   Constitutional: Negative for chills and fever.   HENT: Negative for sore throat and trouble swallowing.    Eyes: Negative for pain and visual disturbance.   Respiratory: Negative for cough and shortness of breath.    Cardiovascular: Positive for leg swelling. Negative for chest pain and palpitations.   Gastrointestinal: Negative for constipation, diarrhea, nausea and vomiting.   Endocrine: Negative for cold intolerance and heat intolerance.   Genitourinary: Negative for difficulty urinating and dysuria.   Musculoskeletal: Negative for neck pain and neck stiffness.   Skin: Negative for pallor and rash.   Allergic/Immunologic: Negative for environmental allergies and food allergies.   Neurological: Negative for seizures and syncope.   Hematological: Negative for adenopathy. Does not bruise/bleed easily.   Psychiatric/Behavioral: Positive for confusion. Negative for agitation.        Objective    Vital Signs  Temp:  [97.9 °F (36.6 °C)-98.1 °F (36.7 °C)] 97.9 °F (36.6 °C)  Heart Rate:  [62-70] 62  Resp:  [16-18] 16  BP: (155-223)/() 191/91  SpO2:  [94 %-97 %] 95 %  on   ;   Device (Oxygen Therapy): room air  Body mass index is 41.05 kg/m².    Physical Exam   Constitutional: He is oriented to person,  place, and time. He appears well-developed. No distress.   HENT:   Head: Normocephalic and atraumatic.   Eyes: Conjunctivae and EOM are normal. Pupils are equal, round, and reactive to light.   Neck: Normal range of motion. Neck supple.   Cardiovascular: Normal rate, regular rhythm and intact distal pulses.    Pulmonary/Chest: Effort normal. He has no wheezes. He has no rales.   Abdominal: Soft. There is no tenderness. There is no guarding.   Musculoskeletal: Normal range of motion. He exhibits edema (LLE 2+).   Neurological: He is alert and oriented to person, place, and time. He exhibits normal muscle tone.   Skin: Skin is warm and dry. He is not diaphoretic.   Psychiatric: He has a normal mood and affect. His behavior is normal.   Nursing note and vitals reviewed.      Results Review:   I reviewed the patient's new clinical results. Reviewed imaging, agree with interpretation. Reviewed telemetry, sinus rhythm, no acute st change. Reviewed prior records.    Lab Results (last 24 hours)     Procedure Component Value Units Date/Time    CBC & Differential [[id number]] Collected:  06/14/18 1047    Specimen:  Blood Updated:  06/14/18 1105    Narrative:       The following orders were created for panel order CBC & Differential.  Procedure                               Abnormality         Status                     ---------                               -----------         ------                     CBC Auto Differential[[id number]]        Abnormal            Final result                 Please view results for these tests on the individual orders.    Comprehensive Metabolic Panel [[id number]]  (Abnormal) Collected:  06/14/18 1047    Specimen:  Blood Updated:  06/14/18 1121     Glucose 107 (H) mg/dL      BUN 16 mg/dL      Creatinine 1.19 mg/dL      Sodium 143 mmol/L      Potassium 3.9 mmol/L      Chloride 102 mmol/L      CO2 28.3 mmol/L      Calcium 9.3 mg/dL      Total Protein 6.7 g/dL      Albumin 4.50 g/dL      ALT  (SGPT) 16 U/L      AST (SGOT) 15 U/L      Alkaline Phosphatase 80 U/L      Total Bilirubin 0.3 mg/dL      eGFR Non African Amer 60 (L) mL/min/1.73      Globulin 2.2 gm/dL      A/G Ratio 2.0 g/dL      BUN/Creatinine Ratio 13.4     Anion Gap 12.7 mmol/L     Protime-INR [[id number]]  (Abnormal) Collected:  06/14/18 1047    Specimen:  Blood Updated:  06/14/18 1120     Protime 11.6 (L) Seconds      INR 0.86 (L)    Troponin [[id number]]  (Abnormal) Collected:  06/14/18 1047    Specimen:  Blood Updated:  06/14/18 1123     Troponin T 0.035 (H) ng/mL     Narrative:       Troponin T Reference Ranges:  Less than 0.03 ng/mL:    Negative for AMI  0.03 to 0.09 ng/mL:      Indeterminant for AMI  Greater than 0.09 ng/mL: Positive for AMI    Lipase [[id number]]  (Normal) Collected:  06/14/18 1047    Specimen:  Blood Updated:  06/14/18 1121     Lipase 46 U/L     CBC Auto Differential [[id number]]  (Abnormal) Collected:  06/14/18 1047    Specimen:  Blood Updated:  06/14/18 1105     WBC 6.94 10*3/mm3      RBC 5.20 10*6/mm3      Hemoglobin 16.2 g/dL      Hematocrit 49.3 %      MCV 94.8 fL      MCH 31.2 pg      MCHC 32.9 g/dL      RDW 15.1 (H) %      RDW-SD 52.5 fl      MPV 10.3 fL      Platelets 177 10*3/mm3      Neutrophil % 63.5 %      Lymphocyte % 23.9 %      Monocyte % 7.8 %      Eosinophil % 4.2 %      Basophil % 0.3 %      Immature Grans % 0.3 %      Neutrophils, Absolute 4.41 10*3/mm3      Lymphocytes, Absolute 1.66 10*3/mm3      Monocytes, Absolute 0.54 10*3/mm3      Eosinophils, Absolute 0.29 10*3/mm3      Basophils, Absolute 0.02 10*3/mm3      Immature Grans, Absolute 0.02 10*3/mm3     Urinalysis With / Microscopic If Indicated (No Culture) - Urine, Clean Catch [[id number]]  (Abnormal) Collected:  06/14/18 1101    Specimen:  Urine from Urine, Clean Catch Updated:  06/14/18 1119     Color, UA Yellow     Appearance, UA Clear     pH, UA 7.0     Specific Gravity, UA 1.017     Glucose, UA Negative     Ketones, UA Negative     Bilirubin,  UA Negative     Blood, UA Negative     Protein,  mg/dL (2+) (A)     Leuk Esterase, UA Negative     Nitrite, UA Negative     Urobilinogen, UA 0.2 E.U./dL    Urinalysis, Microscopic Only - Urine, Clean Catch [[id number]] Collected:  06/14/18 1101    Specimen:  Urine from Urine, Clean Catch Updated:  06/14/18 1119     RBC, UA 0-2 /HPF      WBC, UA 0-2 /HPF      Bacteria, UA None Seen /HPF      Squamous Epithelial Cells, UA 0-2 /HPF      Hyaline Casts, UA 0-2 /LPF      Methodology Automated Microscopy    POC Glucose Once [[id number]]  (Normal) Collected:  06/14/18 1227    Specimen:  Blood Updated:  06/14/18 1229     Glucose 96 mg/dL     Narrative:       Meter: [id number] : [id number] [facility]          CT Head Without Contrast   Final Result   No evidence of acute infarction or hemorrhage. There is an   area of decreased attenuation involving the periventricular white matter   of the right frontal lobe posteriorly and superiorly suggesting a remote   infarct. Moderate vascular calcification is noted. No convincing acute   infarction is identified. There is no evidence of hemorrhage. The above   information was called to and discussed with Dr. Hernandez.               Radiation dose reduction techniques were utilized, including automated   exposure control and exposure modulation based on body size.       This report was finalized on 6/14/2018 4:46 PM by Dr. Mike Caraballo M.D.          MRI Brain With & Without Contrast    (Results Pending)   MRI Angiogram Head Without Contrast    (Results Pending)   MRI Angiogram Neck With & Without Contrast    (Results Pending)     Assessment/Plan      Active Hospital Problems (** Indicates Principal Problem)    Diagnosis Date Noted   • **Transient cerebral ischemia [G45.9] 06/14/2018   • Troponin level elevated [R74.8] 06/14/2018   • Localized edema [R60.0] 06/14/2018   • Chronic heart failure [I50.9] 06/14/2018   • Essential hypertension [I10] 06/14/2018       Resolved Hospital Problems    Diagnosis Date Noted Date Resolved   No resolved problems to display.     - TIA: Facial numbness improved. CT without bleed. ASA/Statin. MRI/A. TTE. Consult Neurology.  - Troponin Elevation: MIld and no active chest pain but he had MI in the past with atypical features. Will trend troponin. Resume coreg. Follow up echo. Cardiology consult.  - Localized Edema: LLE. Check Duplex.  - Chronic Heart Failure: Can resume lasix if MRI negative and or as BP allows.  - HTN: Permissive HTN with possible acute stroke. Labetalol prn.  - PPx SCD  - Full Code      I discussed the patients findings and my recommendations with patient, family, nursing staff and consulting provider.      Bret Wilde MD  [facility]  06/14/18  5:25 PM

## 2018-06-15 ENCOUNTER — APPOINTMENT (OUTPATIENT)
Dept: CARDIOLOGY | Facility: HOSPITAL | Age: 73
End: 2018-06-15
Attending: PSYCHIATRY & NEUROLOGY

## 2018-06-15 LAB
ALBUMIN SERPL-MCNC: 3.9 G/DL (ref 3.5–5.2)
ALBUMIN/GLOB SERPL: 1.6 G/DL
ALP SERPL-CCNC: 66 U/L (ref 39–117)
ALT SERPL W P-5'-P-CCNC: 12 U/L (ref 1–41)
ANION GAP SERPL CALCULATED.3IONS-SCNC: 9.3 MMOL/L
AST SERPL-CCNC: 14 U/L (ref 1–40)
BH CV XLRA MEAS LEFT DIST CCA EDV: 12.3 CM/SEC
BH CV XLRA MEAS LEFT DIST CCA PSV: 79.7 CM/SEC
BH CV XLRA MEAS LEFT DIST ICA EDV: -11.6 CM/SEC
BH CV XLRA MEAS LEFT DIST ICA PSV: -41.8 CM/SEC
BH CV XLRA MEAS LEFT MID ICA EDV: -14.1 CM/SEC
BH CV XLRA MEAS LEFT MID ICA PSV: -55.6 CM/SEC
BH CV XLRA MEAS LEFT PROX CCA EDV: 11.1 CM/SEC
BH CV XLRA MEAS LEFT PROX CCA PSV: 76.2 CM/SEC
BH CV XLRA MEAS LEFT PROX ECA EDV: -10.2 CM/SEC
BH CV XLRA MEAS LEFT PROX ECA PSV: -156 CM/SEC
BH CV XLRA MEAS LEFT PROX ICA EDV: 15.7 CM/SEC
BH CV XLRA MEAS LEFT PROX ICA PSV: 124 CM/SEC
BH CV XLRA MEAS LEFT PROX SCLA PSV: 127 CM/SEC
BH CV XLRA MEAS LEFT VERTEBRAL A EDV: 7.4 CM/SEC
BH CV XLRA MEAS LEFT VERTEBRAL A PSV: 24.8 CM/SEC
BH CV XLRA MEAS RIGHT DIST CCA EDV: 10.6 CM/SEC
BH CV XLRA MEAS RIGHT DIST CCA PSV: 53.4 CM/SEC
BH CV XLRA MEAS RIGHT DIST ICA EDV: -12.9 CM/SEC
BH CV XLRA MEAS RIGHT DIST ICA PSV: -46 CM/SEC
BH CV XLRA MEAS RIGHT MID ICA EDV: -21.7 CM/SEC
BH CV XLRA MEAS RIGHT MID ICA PSV: -96.2 CM/SEC
BH CV XLRA MEAS RIGHT PROX CCA EDV: 8.8 CM/SEC
BH CV XLRA MEAS RIGHT PROX CCA PSV: 65.7 CM/SEC
BH CV XLRA MEAS RIGHT PROX ECA EDV: 10 CM/SEC
BH CV XLRA MEAS RIGHT PROX ECA PSV: 94.4 CM/SEC
BH CV XLRA MEAS RIGHT PROX ICA EDV: -33.4 CM/SEC
BH CV XLRA MEAS RIGHT PROX ICA PSV: -104 CM/SEC
BH CV XLRA MEAS RIGHT PROX SCLA PSV: 137 CM/SEC
BH CV XLRA MEAS RIGHT VERTEBRAL A EDV: 8.6 CM/SEC
BH CV XLRA MEAS RIGHT VERTEBRAL A PSV: 25.9 CM/SEC
BILIRUB SERPL-MCNC: 0.5 MG/DL (ref 0.1–1.2)
BUN BLD-MCNC: 15 MG/DL (ref 8–23)
BUN/CREAT SERPL: 14.9 (ref 7–25)
CALCIUM SPEC-SCNC: 9.3 MG/DL (ref 8.6–10.5)
CHLORIDE SERPL-SCNC: 101 MMOL/L (ref 98–107)
CHOLEST SERPL-MCNC: 218 MG/DL (ref 0–200)
CO2 SERPL-SCNC: 30.7 MMOL/L (ref 22–29)
CREAT BLD-MCNC: 1.01 MG/DL (ref 0.76–1.27)
DEPRECATED RDW RBC AUTO: 55.3 FL (ref 37–54)
ERYTHROCYTE [DISTWIDTH] IN BLOOD BY AUTOMATED COUNT: 15.6 % (ref 11.5–14.5)
GFR SERPL CREATININE-BSD FRML MDRD: 73 ML/MIN/1.73
GLOBULIN UR ELPH-MCNC: 2.4 GM/DL
GLUCOSE BLD-MCNC: 100 MG/DL (ref 65–99)
GLUCOSE BLDC GLUCOMTR-MCNC: 101 MG/DL (ref 70–130)
GLUCOSE BLDC GLUCOMTR-MCNC: 105 MG/DL (ref 70–130)
GLUCOSE BLDC GLUCOMTR-MCNC: 133 MG/DL (ref 70–130)
GLUCOSE BLDC GLUCOMTR-MCNC: 99 MG/DL (ref 70–130)
HBA1C MFR BLD: 5.3 % (ref 4.8–5.6)
HCT VFR BLD AUTO: 45.9 % (ref 40.4–52.2)
HDLC SERPL-MCNC: 54 MG/DL (ref 40–60)
HGB BLD-MCNC: 14.8 G/DL (ref 13.7–17.6)
INR PPP: 0.98 (ref 0.9–1.1)
LDLC SERPL CALC-MCNC: 137 MG/DL (ref 0–100)
LDLC/HDLC SERPL: 2.54 {RATIO}
LEFT ARM BP: NORMAL MMHG
MCH RBC QN AUTO: 30.8 PG (ref 27–32.7)
MCHC RBC AUTO-ENTMCNC: 32.2 G/DL (ref 32.6–36.4)
MCV RBC AUTO: 95.6 FL (ref 79.8–96.2)
PLATELET # BLD AUTO: 150 10*3/MM3 (ref 140–500)
PMV BLD AUTO: 9.9 FL (ref 6–12)
POTASSIUM BLD-SCNC: 3.9 MMOL/L (ref 3.5–5.2)
PROT SERPL-MCNC: 6.3 G/DL (ref 6–8.5)
PROTHROMBIN TIME: 12.8 SECONDS (ref 11.7–14.2)
RBC # BLD AUTO: 4.8 10*6/MM3 (ref 4.6–6)
RIGHT ARM BP: NORMAL MMHG
SODIUM BLD-SCNC: 141 MMOL/L (ref 136–145)
TRIGL SERPL-MCNC: 135 MG/DL (ref 0–150)
TROPONIN T SERPL-MCNC: 0.02 NG/ML (ref 0–0.03)
TROPONIN T SERPL-MCNC: 0.03 NG/ML (ref 0–0.03)
TSH SERPL DL<=0.05 MIU/L-ACNC: >100 MIU/ML (ref 0.27–4.2)
VIT B12 BLD-MCNC: 284 PG/ML (ref 211–946)
VLDLC SERPL-MCNC: 27 MG/DL (ref 5–40)
WBC NRBC COR # BLD: 6.37 10*3/MM3 (ref 4.5–10.7)

## 2018-06-15 PROCEDURE — 83036 HEMOGLOBIN GLYCOSYLATED A1C: CPT | Performed by: INTERNAL MEDICINE

## 2018-06-15 PROCEDURE — 82607 VITAMIN B-12: CPT | Performed by: PSYCHIATRY & NEUROLOGY

## 2018-06-15 PROCEDURE — 80053 COMPREHEN METABOLIC PANEL: CPT | Performed by: INTERNAL MEDICINE

## 2018-06-15 PROCEDURE — 82962 GLUCOSE BLOOD TEST: CPT

## 2018-06-15 PROCEDURE — 80061 LIPID PANEL: CPT | Performed by: INTERNAL MEDICINE

## 2018-06-15 PROCEDURE — 84443 ASSAY THYROID STIM HORMONE: CPT | Performed by: PSYCHIATRY & NEUROLOGY

## 2018-06-15 PROCEDURE — 85027 COMPLETE CBC AUTOMATED: CPT | Performed by: INTERNAL MEDICINE

## 2018-06-15 PROCEDURE — 25010000002 CYANOCOBALAMIN PER 1000 MCG: Performed by: PSYCHIATRY & NEUROLOGY

## 2018-06-15 PROCEDURE — 85610 PROTHROMBIN TIME: CPT | Performed by: INTERNAL MEDICINE

## 2018-06-15 PROCEDURE — 99222 1ST HOSP IP/OBS MODERATE 55: CPT | Performed by: INTERNAL MEDICINE

## 2018-06-15 PROCEDURE — 97535 SELF CARE MNGMENT TRAINING: CPT

## 2018-06-15 PROCEDURE — 93010 ELECTROCARDIOGRAM REPORT: CPT | Performed by: INTERNAL MEDICINE

## 2018-06-15 PROCEDURE — 99223 1ST HOSP IP/OBS HIGH 75: CPT | Performed by: PSYCHIATRY & NEUROLOGY

## 2018-06-15 PROCEDURE — 84484 ASSAY OF TROPONIN QUANT: CPT | Performed by: INTERNAL MEDICINE

## 2018-06-15 PROCEDURE — 97165 OT EVAL LOW COMPLEX 30 MIN: CPT

## 2018-06-15 PROCEDURE — 93005 ELECTROCARDIOGRAM TRACING: CPT | Performed by: INTERNAL MEDICINE

## 2018-06-15 PROCEDURE — 92523 SPEECH SOUND LANG COMPREHEN: CPT

## 2018-06-15 PROCEDURE — 93880 EXTRACRANIAL BILAT STUDY: CPT

## 2018-06-15 RX ORDER — ASPIRIN 81 MG/1
81 TABLET ORAL DAILY
Status: DISCONTINUED | OUTPATIENT
Start: 2018-06-15 | End: 2018-06-17 | Stop reason: HOSPADM

## 2018-06-15 RX ORDER — AMLODIPINE BESYLATE 5 MG/1
5 TABLET ORAL
Status: DISCONTINUED | OUTPATIENT
Start: 2018-06-15 | End: 2018-06-17 | Stop reason: HOSPADM

## 2018-06-15 RX ORDER — ACETAMINOPHEN 325 MG/1
650 TABLET ORAL EVERY 6 HOURS PRN
Status: DISCONTINUED | OUTPATIENT
Start: 2018-06-15 | End: 2018-06-17 | Stop reason: HOSPADM

## 2018-06-15 RX ORDER — HYDRALAZINE HYDROCHLORIDE 50 MG/1
100 TABLET, FILM COATED ORAL EVERY 12 HOURS SCHEDULED
Status: DISCONTINUED | OUTPATIENT
Start: 2018-06-15 | End: 2018-06-17 | Stop reason: HOSPADM

## 2018-06-15 RX ORDER — CYANOCOBALAMIN 1000 UG/ML
1000 INJECTION, SOLUTION INTRAMUSCULAR; SUBCUTANEOUS ONCE
Status: COMPLETED | OUTPATIENT
Start: 2018-06-15 | End: 2018-06-15

## 2018-06-15 RX ORDER — ATORVASTATIN CALCIUM 20 MG/1
40 TABLET, FILM COATED ORAL NIGHTLY
Status: DISCONTINUED | OUTPATIENT
Start: 2018-06-15 | End: 2018-06-17 | Stop reason: HOSPADM

## 2018-06-15 RX ORDER — CLOPIDOGREL BISULFATE 75 MG/1
75 TABLET ORAL DAILY
Status: DISCONTINUED | OUTPATIENT
Start: 2018-06-15 | End: 2018-06-17 | Stop reason: HOSPADM

## 2018-06-15 RX ORDER — CHOLECALCIFEROL (VITAMIN D3) 125 MCG
1000 CAPSULE ORAL DAILY
Status: DISCONTINUED | OUTPATIENT
Start: 2018-06-16 | End: 2018-06-17 | Stop reason: HOSPADM

## 2018-06-15 RX ADMIN — ASPIRIN 325 MG: 325 TABLET ORAL at 08:43

## 2018-06-15 RX ADMIN — TRAZODONE HYDROCHLORIDE 50 MG: 50 TABLET ORAL at 21:48

## 2018-06-15 RX ADMIN — ATORVASTATIN CALCIUM 40 MG: 20 TABLET, FILM COATED ORAL at 21:48

## 2018-06-15 RX ADMIN — HYDRALAZINE HYDROCHLORIDE 100 MG: 50 TABLET, FILM COATED ORAL at 11:16

## 2018-06-15 RX ADMIN — ACETAMINOPHEN 650 MG: 325 TABLET, FILM COATED ORAL at 05:54

## 2018-06-15 RX ADMIN — HYDRALAZINE HYDROCHLORIDE 100 MG: 50 TABLET, FILM COATED ORAL at 21:48

## 2018-06-15 RX ADMIN — NICARDIPINE HYDROCHLORIDE 5 MG/HR: 2.5 INJECTION INTRAVENOUS at 14:14

## 2018-06-15 RX ADMIN — CLOPIDOGREL 75 MG: 75 TABLET, FILM COATED ORAL at 14:13

## 2018-06-15 RX ADMIN — CYANOCOBALAMIN 1000 MCG: 1000 INJECTION, SOLUTION INTRAMUSCULAR at 21:46

## 2018-06-15 RX ADMIN — CARVEDILOL 3.12 MG: 3.12 TABLET, FILM COATED ORAL at 07:44

## 2018-06-15 RX ADMIN — LABETALOL HYDROCHLORIDE 20 MG: 5 INJECTION INTRAVENOUS at 07:45

## 2018-06-15 RX ADMIN — AMLODIPINE BESYLATE 5 MG: 5 TABLET ORAL at 11:16

## 2018-06-15 RX ADMIN — NICARDIPINE HYDROCHLORIDE 7.5 MG/HR: 2.5 INJECTION INTRAVENOUS at 21:51

## 2018-06-15 NOTE — CONSULTS
"Name: Beverly Clayton ADMIT: 2018   : 1945  PCP: Provider Not In System    MRN: [id number] LOS: 1 days   AGE/SEX: 72 y.o. male  ROOM: 545/     Inpatient Vascular Surgery Consult  Consult performed by: FERNANDA MORELOS  Consult ordered by: STEPHANIE REINOSO MD     LOS: 1 day   Patient Care Team:  Provider Not In System as PCP - General    Subjective     History of Present Illness  72 y.o. male who was traveling through [facility] on his way to [facility] in [place].  We'll saying his hotel yesterday he felt weak all over, had some left arm tingling, and some pressure in his head.  For this reason, he went inside the bed and told his wife that he wasn't doing well.  He then went went to the restroom and began to feel even worse and asked that she call 911.  The symptoms resolved after a few hours spontaneously.  He was seen here and a stroke workup has been undertaken.  We were asked to see him because of an abnormal MR angiogram with a mild stenosis of the right ICA.  No new stroke seen on MRI by report.Patient also reports a long-standing history of high blood pressure and a previous \"minor stroke\" that may have been related to blood pressure as well.  He also has had coronary artery disease with previous PCI to heart attack 11 years ago.    HPI Elements: Patient complains of carotid artery disease. Problem is located mainly in the right internal carotid. The pain is described as tingling, and is 1/10 in intensity. Pain is not present. Onset was 1 day ago. Symptoms have been completely resolved since.  The patient's risks factors for peripheral vascular disease include arteriosclerotic heart disease, cerebral vascular disease and hypercholesterolemia.  The patient denies a history of diabetes mellitus    Review of Systems   Constitutional: Positive for fatigue.   HENT: Positive for sinus pressure.    Neurological: Positive for speech difficulty (possible word finding issues for " months), weakness, light-headedness and numbness.   All other systems reviewed and are negative.      PMH: Chronic heart failure, hypertension, edema, possible TIA, coronary artery disease, melanoma,    Family History: Diabetes, obesity, coronary artery disease    Social History: Patient smoked for many years and then quit a while back but restarted several months ago and is smoking 4-6 cigarettes a day.    Allergies: Penicillins      Objective   Temp:  [97.9 °F (36.6 °C)-98.4 °F (36.9 °C)] 98.4 °F (36.9 °C)  Heart Rate:  [47-70] 67  Resp:  [16-20] 20  BP: (191-208)/() 202/104    I/O this shift:  In: 480 [P.O.:480]  Out: 400 [Urine:400]    Physical Exam   Constitutional: He is oriented to person, place, and time. He appears well-developed and well-nourished. No distress.   HENT:   Right Ear: External ear normal.   Left Ear: External ear normal.   Nose: Nose normal.   Mouth/Throat: Normal dentition.   Eyes: Conjunctivae, EOM and lids are normal. Pupils are equal, round, and reactive to light.   Neck: No JVD present. Carotid bruit is not present. No thyromegaly present.   Cardiovascular: Normal rate, regular rhythm and normal heart sounds.    No murmur heard.  Pulses:       Carotid pulses are 2+ on the right side, and 2+ on the left side.       Radial pulses are 2+ on the right side, and 2+ on the left side.   Bilateral extremity moderate peripheral edema that is pitting in nature   Pulmonary/Chest: Effort normal and breath sounds normal.   Abdominal: Soft. Normal appearance. He exhibits no distension. There is no hepatosplenomegaly. There is no tenderness.   Musculoskeletal:   No digital cyanosis or clubbing   Neurological: He is alert and oriented to person, place, and time.   Skin: Skin is warm, dry and intact.   Psychiatric: He has a normal mood and affect. His behavior is normal. Judgment normal.   Vitals reviewed.        Results from last 7 days  Lab Units 06/15/18  0603 06/14/18  1047   WBC 10*3/mm3 6.37  6.94   HEMOGLOBIN g/dL 14.8 16.2   PLATELETS 10*3/mm3 150 177     Results from last 7 days  Lab Units 06/15/18  0603 18  1047   SODIUM mmol/L 141 143   POTASSIUM mmol/L 3.9 3.9   CHLORIDE mmol/L 101 102   CO2 mmol/L 30.7* 28.3   BUN mg/dL 15 16   CREATININE mg/dL 1.01 1.19   GLUCOSE mg/dL 100* 107*   Estimated Creatinine Clearance: 87.2 mL/min (by C-G formula based on SCr of 1.01 mg/dL).  Results from last 7 days  Lab Units 06/15/18  0603 18  1047   PROTIME Seconds 12.8 11.6*   INR  0.98 0.86*       Imaging Studies:  MR angiogram report and images reviewed.  Some mild irregularity of the right internal carotid artery on reconstructions but no obvious severe stenosis.  Report shows no evidence of stroke that is new.    Carotid ultrasound pending      Active Hospital Problems (** Indicates Principal Problem)    Diagnosis Date Noted   • **Transient cerebral ischemia [G45.9] 2018   • Troponin level elevated [R74.8] 2018   • Localized edema [R60.0] 2018   • Chronic heart failure [I50.9] 2018   • Essential hypertension [I10] 2018      Resolved Hospital Problems    Diagnosis Date Noted Date Resolved   No resolved problems to display.     Problem Points:  4:  Patient has a new problem, with additional work-up planned  Total problem points:4 or more    Data Points:  1:  I have reviewed or order clinical lab test  1:  I have reviewed or order radiology test (except heart catheterization or echo)  2:  I have personally and independently review of image, tracing, or specimen  Total data points:4 or more    Risk Points:  High:  Abrupt change in neuorlogic exam    [place]illin    Assessment/Plan     Principal Problem:    Transient cerebral ischemia  Active Problems:    Troponin level elevated    Localized edema    Chronic heart failure    Essential hypertension        72 y.o. male with sudden onset of symptoms that could potentially be related to a TIA versus hypertensive  encephalopathy.  He has been seen by neurology but there are no has not yet been finalized.  I did discuss with Dr. Blanca over the phone.  The MRI lesion does not look severe but it is degraded with some motion artifact.  I recommend that we move forward with ultrasound.  If this looks like a moderate to severe lesion by ultrasound and we could consider CT angiography for further classification.  If less than 50% clearly would want medical management only.    I discussed the patients findings and my recommendations with patient and consulting provider.  Please call my office with any question: [phone]    Alexandro Fried MD  06/15/18  3:07 PM

## 2018-06-15 NOTE — PLAN OF CARE
Problem: Stroke (Ischemic) (Adult)  Goal: Signs and Symptoms of Listed Potential Problems Will be Absent, Minimized or Managed (Stroke)  Outcome: Ongoing (interventions implemented as appropriate)   06/15/18 0525   Goal/Outcome Evaluation   Problems Assessed (Stroke (Ischemic)) all   Problems Assessed (Stroke (Ischemic)) none       Problem: Patient Care Overview  Goal: Plan of Care Review  Outcome: Ongoing (interventions implemented as appropriate)   06/15/18 0525   Coping/Psychosocial   Plan of Care Reviewed With patient   Plan of Care Review   Progress no change   OTHER   Outcome Summary Pt. admitted to floor for possible stroke, ct and mri negative. Hypertensive but other vitals stable. NIH 0. Alert and oriented, no issues noted. Will continue to monitor       Problem: Fall Risk (Adult)  Goal: Identify Related Risk Factors and Signs and Symptoms  Outcome: Outcome(s) achieved Date Met: 06/15/18   06/15/18 0525   Fall Risk (Adult)   Related Risk Factors (Fall Risk) gait/mobility problems;environment unfamiliar   Signs and Symptoms (Fall Risk) presence of risk factors     Goal: Absence of Fall  Outcome: Ongoing (interventions implemented as appropriate)   06/15/18 0525   Fall Risk (Adult)   Absence of Fall making progress toward outcome       Problem: Skin Injury Risk (Adult)  Goal: Identify Related Risk Factors and Signs and Symptoms  Outcome: Outcome(s) achieved Date Met: 06/15/18   06/15/18 0525   Skin Injury Risk (Adult)   Related Risk Factors (Skin Injury Risk) body weight extremes;edema;moisture     Goal: Skin Health and Integrity  Outcome: Ongoing (interventions implemented as appropriate)   06/15/18 0525   Skin Injury Risk (Adult)   Skin Health and Integrity making progress toward outcome

## 2018-06-15 NOTE — CONSULTS
Encounter Date: 6/15/2018    CHIEF COMPLAINT: Transient Left face arm and leg numbness    Patient Active Problem List   Diagnosis   • Transient cerebral ischemia   • Troponin level elevated   • Localized edema   • Chronic heart failure   • Essential hypertension       PRESENT ILLNESS:    Portions of this notes is copied from previous physician encounters, reviewed and edited appropriately.    Background:     Beverly Clayton is a 72 y.o. male with history of congestive heart failure coronary artery disease hypertension dyslipidemia melanoma myocardial infarction stroke in the past now admitted with transient left facial numbness associated with arm numbness which resolved within a few minutes.  Patient's blood pressure was elevated at that time.  Patient has history of CVA however we don't have any details available.  Patient is not on aspirin at home.    Patient examined by the bedside and he mentions that his back to his baseline.    Review of systems   No neck stiffness  No photophobia  All systems reviewed and are negative.         Past Medical History:   Diagnosis Date   • Cancer    • CHF (congestive heart failure)    • Coronary artery disease    • Disease of thyroid gland    • Hyperlipidemia    • Hypertension    • Injury of back    • Melanoma    • Myocardial infarction    • Renal disorder    • Stroke        Past Surgical History:   Procedure Laterality Date   • JOINT REPLACEMENT     • LYMPHADENECTOMY         Current Facility-Administered Medications   Medication Dose Route Frequency Provider Last Rate Last Dose   • acetaminophen (TYLENOL) tablet 650 mg  650 mg Oral Q6H PRN Tre Cook MD   650 mg at 06/15/18 0554   • albuterol (PROVENTIL) nebulizer solution 0.083% 2.5 mg/3mL  2.5 mg Nebulization Q6H PRN Bret Wilde MD       • ALPRAZolam (XANAX) tablet 0.5 mg  0.5 mg Oral Nightly PRN Bret Wilde MD   0.5 mg at 06/14/18 2313   • aluminum-magnesium hydroxide-simethicone (MAALOX MAX) 400-400-40  MG/5ML suspension 7.5 mL  7.5 mL Oral Q4H PRN Bret Wilde MD       • amLODIPine (NORVASC) tablet 5 mg  5 mg Oral Q24H Tushar Meza MD   5 mg at 06/15/18 1116   • aspirin tablet 325 mg  325 mg Oral Daily Bret Wilde MD   325 mg at 06/15/18 0843    Or   • aspirin suppository 300 mg  300 mg Rectal Daily Bret Wilde MD       • atorvastatin (LIPITOR) tablet 80 mg  80 mg Oral Nightly Bret Wilde MD   80 mg at 06/14/18 2316   • bisacodyl (DULCOLAX) suppository 10 mg  10 mg Rectal Daily PRN Bret Wilde MD       • carvedilol (COREG) tablet 3.125 mg  3.125 mg Oral BID With Meals Bret Wilde MD   3.125 mg at 06/15/18 0744   • docusate sodium (COLACE) capsule 100 mg  100 mg Oral BID PRN Bret Wilde MD       • hydrALAZINE (APRESOLINE) tablet 100 mg  100 mg Oral Q12H Tushar Meza MD   100 mg at 06/15/18 1116   • labetalol (NORMODYNE,TRANDATE) injection 20 mg  20 mg Intravenous Q6H PRN Bret Wilde MD   20 mg at 06/15/18 0745   • levothyroxine (SYNTHROID, LEVOTHROID) tablet 300 mcg  300 mcg Oral Daily Bret Wilde MD   300 mcg at 06/14/18 2316   • ondansetron (ZOFRAN) injection 4 mg  4 mg Intravenous Q6H PRN Bret Wilde MD       • pneumococcal polysaccharide 23-valent (PNEUMOVAX-23) vaccine 0.5 mL  0.5 mL Intramuscular During Hospitalization Bret Wilde MD       • sertraline (ZOLOFT) tablet 50 mg  50 mg Oral Daily Bret Wilde MD   50 mg at 06/14/18 2316   • sodium chloride 0.9 % flush 1-10 mL  1-10 mL Intravenous PRN Bret Wilde MD       • sodium chloride 0.9 % flush 10 mL  10 mL Intravenous PRN Tutu Hernandez MD       • traZODone (DESYREL) tablet 50 mg  50 mg Oral Nightly Bret Wilde MD   50 mg at 06/14/18 2316       Allergies   Allergen Reactions   • Penicillins Unknown (See Comments)     .       Social History     Social History   • Marital status:      Spouse name: N/A   • Number of children: N/A   •  Years of education: N/A     Occupational History   • Not on file.     Social History Main Topics   • Smoking status: Current Every Day Smoker     Packs/day: 0.50   • Smokeless tobacco: Never Used   • Alcohol use Yes      Comment: couple times a week   • Drug use: No   • Sexual activity: Defer     Other Topics Concern   • Not on file     Social History Narrative   • No narrative on file       History reviewed. No pertinent family history.    No family status information on file.       No current facility-administered medications on file prior to encounter.      No current outpatient prescriptions on file prior to encounter.           PHYSICAL EXAMINATION:    Vitals: Patient Vitals for the past 4 hrs:   BP Pulse SpO2   06/15/18 0841 (!) 191/103 (!) 47 94 %     Temp:  [97.9 °F (36.6 °C)-98.4 °F (36.9 °C)] 98.4 °F (36.9 °C)  Heart Rate:  [47-70] 47  Resp:  [16-20] 20  BP: (191-223)/() 191/103    General:  pleasant in no acute distress.  HEENT: No pallor or icterus. Neck supple. No Carotid bruits.  Heart: S1 and S2 normal with regular rhythm.  No murmur.       GENERAL NEUROLOGIC EXAM     Mental Status: Awake alert and oriented to person place and time. Language is normal for comprehension, repetition, naming and fluency. Recent and remote memory were normal.  Attention span and concentration were normal. Fund of knowledge was normal.      Cranial nerves:  Fundi were normal bilaterally.  Visual fields were full to confrontation.  Pupils are equal regular and reactive to light. Extraocular movements are intact. Facial sensation was normal and symmetrical bilaterally. Muscles of facial expression were strong and symmetric. Hearing to finger rub normal bilaterally. Palate elevates symmetrically. Sternocleidomastoid and trapezius normal. The tongue protrudes midline without atrophy or fasciculations.      Motor: Normal tone and bulk with no involuntary movements or pronator drift.    Strength normal 5/5 in bilateral  upper and lower extremities.     Sensation: Light touch, pin prick, vibration and joint position sense were normal in the upper and lower extremities.     Reflexes: 2+ bilaterally symmetrical with down going toes.    Coordination: finger nose and heel shin test normal bilaterally.     Gait: Normal station and gait.  Heel, toe and tandem walk normal.  Romberg negative.     Labs:     Lab Results   Component Value Date    HGB 14.8 06/15/2018    HCT 45.9 06/15/2018    WBC 6.37 06/15/2018     06/15/2018     Lab Results   Component Value Date    BUN 15 06/15/2018    CALCIUM 9.3 06/15/2018     06/15/2018    K 3.9 06/15/2018     06/15/2018    CO2 30.7 (H) 06/15/2018     Lab Results   Component Value Date    ALT 12 06/15/2018    AST 14 06/15/2018    BILITOT 0.5 06/15/2018     Lab Results   Component Value Date    PROTIME 12.8 06/15/2018    INR 0.98 06/15/2018     No components found for: POCGLUC  No components found for: A1C  Lab Results   Component Value Date    HDL 54 06/15/2018     (H) 06/15/2018     No components found for: B12  No results found for: TSH    Lab Results (last 24 hours)     Procedure Component Value Units Date/Time    POC Glucose Once [[id number]]  (Normal) Collected:  06/15/18 1109    Specimen:  Blood Updated:  06/15/18 1111     Glucose 101 mg/dL     Narrative:       Meter: [id number] : [id number] Benedict Newsome JEN    Troponin [[id number]]  (Normal) Collected:  06/15/18 0603    Specimen:  Blood Updated:  06/15/18 0719     Troponin T 0.020 ng/mL     Narrative:       Troponin T Reference Ranges:  Less than 0.03 ng/mL:    Negative for AMI  0.03 to 0.09 ng/mL:      Indeterminant for AMI  Greater than 0.09 ng/mL: Positive for AMI    Comprehensive Metabolic Panel [[id number]]  (Abnormal) Collected:  06/15/18 0603    Specimen:  Blood Updated:  06/15/18 0655     Glucose 100 (H) mg/dL      BUN 15 mg/dL      Creatinine 1.01 mg/dL      Sodium 141 mmol/L      Potassium 3.9 mmol/L       Chloride 101 mmol/L      CO2 30.7 (H) mmol/L      Calcium 9.3 mg/dL      Total Protein 6.3 g/dL      Albumin 3.90 g/dL      ALT (SGPT) 12 U/L      AST (SGOT) 14 U/L      Alkaline Phosphatase 66 U/L      Total Bilirubin 0.5 mg/dL      eGFR Non African Amer 73 mL/min/1.73      Globulin 2.4 gm/dL      A/G Ratio 1.6 g/dL      BUN/Creatinine Ratio 14.9     Anion Gap 9.3 mmol/L     Lipid Panel [[id number]]  (Abnormal) Collected:  06/15/18 0603    Specimen:  Blood Updated:  06/15/18 0655     Total Cholesterol 218 (H) mg/dL      Triglycerides 135 mg/dL      HDL Cholesterol 54 mg/dL      LDL Cholesterol  137 (H) mg/dL      VLDL Cholesterol 27 mg/dL      LDL/HDL Ratio 2.54    Narrative:       Cholesterol Reference Ranges  (U.S. Department of Health and Human Services ATP III Classifications)    Desirable          <200 mg/dL  Borderline High    200-239 mg/dL  High Risk          >240 mg/dL      Triglyceride Reference Ranges  (U.S. Department of Health and Human Services ATP III Classifications)    Normal           <150 mg/dL  Borderline High  150-199 mg/dL  High             200-499 mg/dL  Very High        >500 mg/dL    HDL Reference Ranges  (U.S. Department of Health and Human Services ATP III Classifcations)    Low     <40 mg/dl (major risk factor for CHD)  High    >60 mg/dl ('negative' risk factor for CHD)        LDL Reference Ranges  (U.S. Department of Health and Human Services ATP III Classifcations)    Optimal          <100 mg/dL  Near Optimal     100-129 mg/dL  Borderline High  130-159 mg/dL  High             160-189 mg/dL  Very High        >189 mg/dL    Protime-INR [[id number]]  (Normal) Collected:  06/15/18 0603    Specimen:  Blood Updated:  06/15/18 0640     Protime 12.8 Seconds      INR 0.98    Hemoglobin A1c [[id number]]  (Normal) Collected:  06/15/18 0603    Specimen:  Blood Updated:  06/15/18 0639     Hemoglobin A1C 5.30 %     Narrative:       Hemoglobin A1C Ranges:    Increased Risk for Diabetes  5.7% to  6.4%  Diabetes                     >= 6.5%  Diabetic Goal                < 7.0%    CBC (No Diff) [[id number]]  (Abnormal) Collected:  06/15/18 0603    Specimen:  Blood Updated:  06/15/18 0637     WBC 6.37 10*3/mm3      RBC 4.80 10*6/mm3      Hemoglobin 14.8 g/dL      Hematocrit 45.9 %      MCV 95.6 fL      MCH 30.8 pg      MCHC 32.2 (L) g/dL      RDW 15.6 (H) %      RDW-SD 55.3 (H) fl      MPV 9.9 fL      Platelets 150 10*3/mm3     POC Glucose Once [[id number]]  (Normal) Collected:  06/15/18 0557    Specimen:  Blood Updated:  06/15/18 0558     Glucose 99 mg/dL     Narrative:       Meter: [id number] : [id number] Michael GONZALES    Troponin [[id number]]  (Normal) Collected:  06/14/18 2352    Specimen:  Blood Updated:  06/15/18 0032     Troponin T 0.025 ng/mL     Narrative:       Troponin T Reference Ranges:  Less than 0.03 ng/mL:    Negative for AMI  0.03 to 0.09 ng/mL:      Indeterminant for AMI  Greater than 0.09 ng/mL: Positive for AMI    Troponin [[id number]]  (Abnormal) Collected:  06/14/18 1905    Specimen:  Blood Updated:  06/14/18 2009     Troponin T 0.032 (H) ng/mL     Narrative:       Troponin T Reference Ranges:  Less than 0.03 ng/mL:    Negative for AMI  0.03 to 0.09 ng/mL:      Indeterminant for AMI  Greater than 0.09 ng/mL: Positive for AMI    POC Glucose Once [[id number]]  (Normal) Collected:  06/14/18 1227    Specimen:  Blood Updated:  06/14/18 1229     Glucose 96 mg/dL     Narrative:       Meter: [id number] : [id number] Effie Abreu          .  Imaging Results (last 24 hours)     Procedure Component Value Units Date/Time    MRI Angiogram Head Without Contrast [[id number]] Collected:  06/14/18 2259     Updated:  06/14/18 2259    Narrative:       BRAIN MRA     HISTORY: Stroke; G45.9-Transient cerebral ischemic attack, unspecified;  R74.8-Abnormal levels of other serum enzymes          FINDINGS: Axial 3-D time-of-flight images of the intracranial arterial  circulation centered at the  level of the [place] were obtained  without contrast. Reconstruction images were supplemented. There are  some images which are degraded by excessive patient motion.     Distal vertebrals are patent, left is minimally dominant. Basilar artery  is ectatic but widely patent. Both posterior cerebrals originate from  the basilar and are unremarkable. Distal internal carotids are grossly  unremarkable. The bifurcation regions are not well seen due to excessive  motion. The left A1 segment does appear hypoplastic. Right A1 segment is  fairly well seen and unremarkable. There does appear to be a tiny  anterior communicating artery present. M1 branches are not well seen.  Trifurcations are grossly unremarkable.     If there is high clinical suspicion of an abnormality, a repeat study  either when the patient can fully cooperate or with conscious sedation  provided is advised.       Impression:       1. Limited but grossly unremarkable exam. Follow-up as above if  indicated                MRI Angiogram Neck With & Without Contrast [[id number]] Collected:  06/14/18 2258     Updated:  06/14/18 2258    Narrative:       MRA NECK WITH AND WITHOUT CONTRAST     CLINICAL HISTORY: Ischemia, stenosis.     COMPARISON: [<None>].     FINDINGS: Axial and coronal source imaging centered about the cervical  carotid bifurcation regions performed with and without gadolinium.  Multiprojection 3-D MIP reformatted images were supplemented and  reviewed.     The innominate artery bifurcation is unremarkable. .     The proximal cervical right common carotid artery is widely patent.   There is some luminal irregularity and mild narrowing in the distal bulb  and bifurcation region extending into the origin of the right internal  carotid artery but narrowing is felt to be less than 50%. The more  peripheral right internal carotid artery is ectatic but widely patent.     The left common carotid artery is widely patent throughout its  course.  Minimal plaque in the bulb and bifurcation region near the origin of the  left internal carotid artery but without stenosis. The left internal  carotid artery is also quite ectatic but widely patent otherwise.     Both distal internal carotid arteries are widely patent to the skull  base.     The vertebral arteries are patent, the left  is dominant in caliber.  There is some focal narrowing of the left vertebral artery at its  origin, approximately 50-60%. Milder, 50% narrowing approximately 2 cm  distal to its origin as well. Right vertebral without significant  stenosis.     Per NASCET criteria, stenosis is mild, 0-49%          Impression:       1.  Mild bifurcation region plaque, right greater than left, but without  significant stenosis.  2. Areas of stenosis of the proximal left vertebral as discussed.          MRI Brain With & Without Contrast [[id number]] Collected:  06/14/18 2231     Updated:  06/14/18 2231    Narrative:       BRAIN MRI WITH AND WITHOUT CONTRAST     HISTORY: Stroke; G45.9-Transient cerebral ischemic attack, unspecified;  R74.8-Abnormal levels of other serum enzymes     COMPARISON: None.     FINDINGS:  Multiplanar images of the head were obtained without and with  gadolinium. There is mild generalized atrophy. Moderately extensive  areas of increased T2 and FLAIR signal abnormality present in the white  matter bilaterally compatible with chronic ischemic gliotic changes.  There is a more confluent focus of encephalomalacia in the posterior  right frontal periventricular white matter which appears chronic as  well. There are dilated arturo-vascular spaces in the basal ganglia and  periventricular regions bilaterally. No foci of restricted diffusion are  demonstrated on diffusion weighted images (DWI) to suggest acute  ischemia. Midline structures corpus callosum, pituitary gland, and  brainstem are unremarkable. The ventricular system is normal in caliber  and configuration for age.  There is no evidence of extra axial mass or  fluid collection. There is no abnormal enhancement following gadolinium  administration. The orbital and encompassed paranasal soft tissues are  unremarkable. Osseous marrow signal intensity is within normal limits..       Impression:       1. Atrophy with chronic-appearing changes as discussed. No acute finding  or abnormal enhancement               CT Head Without Contrast [[id number]] Collected:  06/14/18 1243     Updated:  06/14/18 1649    Narrative:       CT HEAD WITHOUT CONTRAST     HISTORY: Weakness, pressure.     COMPARISON: None.     FINDINGS:     The study is hampered by patient motion.     There is an area of decreased attenuation appreciated superior lateral  and posterior lateral to the head of the caudate nucleus on the right  suggesting a remote infarct measuring 12-13 mm in maximum transverse  dimension. Areas of decreased attenuation are present involving the  white matter of the cerebral hemispheres bilaterally suggesting  mild-to-moderate small vessel ischemic disease. No focal area of  decreased attenuation to suggest acute infarction is identified.  Moderate vascular calcification is noted.       Impression:       No evidence of acute infarction or hemorrhage. There is an  area of decreased attenuation involving the periventricular white matter  of the right frontal lobe posteriorly and superiorly suggesting a remote  infarct. Moderate vascular calcification is noted. No convincing acute  infarction is identified. There is no evidence of hemorrhage. The above  information was called to and discussed with Dr. Hernandez.           Radiation dose reduction techniques were utilized, including automated  exposure control and exposure modulation based on body size.     This report was finalized on 6/14/2018 4:46 PM by Dr. Mike Caraballo M.D.             For this problem, the following was ordered:  Orders Placed This Encounter   Procedures   • CT Head  Without Contrast   • MRI Brain With & Without Contrast   • MRI Angiogram Head Without Contrast   • MRI Angiogram Neck With & Without Contrast   • Comprehensive Metabolic Panel   • Protime-INR   • Troponin   • Urinalysis With / Microscopic If Indicated (No Culture) - Urine, Clean Catch   • Lipase   • CBC Auto Differential   • Urinalysis, Microscopic Only - Urine, Clean Catch   • Hemoglobin A1c   • Lipid Panel   • CBC (No Diff)   • Comprehensive Metabolic Panel   • Protime-INR   • Troponin   • Troponin   • Basic Metabolic Panel   • Diet Regular; Cardiac   • Ambulate patient   • Vital Signs Per Hospital Policy   • Vital Signs   • Pulse Oximetry, Continuous   • Cardiac Monitoring   • Elevate Head of Bed   • Turn Patient   • Intake and Output   • Neuro Checks   • NIHSS Assessment   • Order CT Head Without Contrast for Neurological Decline   • Place Compression Stockings (TEDs)   • Remove & Replace Compression Stockings (TEDS) Daily   • Provide Stroke Education Material   • Nursing Dysphagia Screening (Complete Prior to Giving Anything By Mouth)   • RN to Place Order SLP Consult - Eval & Treat Choosing Reason of RN Dysphagia Screen Failed   • Nurse to Call MD or Nutrition Services for Diet if Patient Passes Dysphagia Screen   • Notify Provider   • Saline Lock & Maintain IV Access   • Place Sequential Compression Device   • Maintain Sequential Compression Device   • Full Code   • [facility] (on-call MD unless specified)   • Inpatient Case Management  Consult   • Inpatient Neuro Clinical Specialist Consult   • Inpatient Rehab Admission Consult   • Inpatient Case Management  Consult   • Inpatient Neurology Consult   • Inpatient Cardiology Consult   • OT Consult: Eval & Treat Stroke Patient   • PT Consult: Eval & Treat   • SLP Consult: Eval & Treat   • SCANNED - TELEMETRY     • POC Glucose Once   • POC Glucose Once   • POC Glucose Q6H   • POC Glucose Once   • POC Glucose Once   • ECG 12 Lead   • ECG 12  Lead   • Adult Transthoracic Echo Complete W/ Cont if Necessary Per Protocol (With Agitated Saline)   • Insert peripheral IV   • Insert Peripheral IV   • Inpatient Admission   • Tele Bed Request   • CBC & Differential   • CBC & Differential       Problem List Items Addressed This Visit     * (Principal)Transient cerebral ischemia - Primary      Other Visit Diagnoses     Elevated troponin              ASSESSMENT/PLAN: This is a 72 y.o. male who has   Past Medical History:   Diagnosis Date   • Cancer    • CHF (congestive heart failure)    • Coronary artery disease    • Disease of thyroid gland    • Hyperlipidemia    • Hypertension    • Injury of back    • Melanoma    • Myocardial infarction    • Renal disorder    • Stroke     presents with Left-sided symptoms concerning for a right hemispheric TIA/CVA.  Brain MRI shows a small DWI restriction in the occipital trigone on the left side concerning for possible embolic event.  He also has old CVA in the corona radiate/basal ganglia on the right.  There is a possibility that it can be recrudescence of old CVA.    1.  Right hemispheric TIA ; small right hemispheric embolic CVA, recrudescence of old CVA from uncontrolled hypertension:    - Stroke labs; B12, TSH, lipid and A1c.  A1c 5.3  B12 low and TSH high  - MRI brain personally reviewed  - Telemetry   - PTOT speech rehabilitation assessment  - DVT prophylaxis  - Swallow study  - Statins  - Echocardiogram.  - Start dual Antiplatelet therapy ×30 days and after that Plavix alone.   - follow up with neurology as out pateint  - b12 replaced.    2. 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   At discharge Secondary stroke prophylaxis: Ideal targets for stroke prevention would be Blood pressure < 130/80; B12 > 500 TSH in normal range and LDL < 70; HbA1c < 6.5 and smoking cessation if applicable.    3.  Right carotid stenosis:    - Carotid ultrasound ordered  - We will need vascular  consult    4. Hypothyroidism management as per primary.    5. Sleep apnea: compliance needed.     Thank you for allowing us to participate in the care of this patient.    Radha Blanca MD  06/15/18  11:47 AM

## 2018-06-15 NOTE — PLAN OF CARE
Problem: Patient Care Overview  Goal: Plan of Care Review  Outcome: Ongoing (interventions implemented as appropriate)   06/15/18 1038   Coping/Psychosocial   Plan of Care Reviewed With patient   OTHER   Outcome Summary Pt presents with L side sensation changes. While pt reports symptoms have resolved, pt is not yet back to baseline for mobility and needs SBA for walking, lower body ADL. Pt may benefit from skilled OT to increase safety and independence.

## 2018-06-15 NOTE — CONSULTS
Patient Name: Beverly Clayton  :1945  72 y.o.    Date of Admission: 2018  Date of Consultation:  06/15/18  Encounter Provider: Homer Gibbs III, MD  Place of Service: [facility] CARDIOLOGY  Referring Provider: Bret Wilde MD  Patient Care Team:  Provider Not In System as PCP - General      Chief complaint: Transient cerebral ischemia    Reason for Consult:  Elevated troponin    History of Present Illness:    Mr Clayton is a 72 year old patient with a history of CHF, hyperlipidemia, hypertension, coronary artery disease (with MI and stent in  and stent to the RCA 2017) , melanoma, and stroke. He is a current smoker. He lives in [place] and is passing through the area on his way to a wedding in [place].      He presented to the ED yesterday with complaints of generalized weakness, pressure on the left side of his head, tingling in the left arm, and some confusion.  CT of the head showed no acute infarction or hemorrhage but area of decreased attenuation involving the right frontal lobe posteriorly and superiorly suggesting a remote infarct.  Labs were as follows:  BUN/ Creat 16/1.19, K 3.9, Troponin 0.035, Hgb 16.2, WBC 6.94, U/A negative. He was admitted for further evaluation.  His blood pressure had been very elevated at 195/107- 223/105.   His routine blood pressure medicines were held at admission to allow permissive control of his blood pressure given his presentation consistent with a TIA.  Neurology has been consulted, and that consultation is pending.  [facility] has been contacted this morning because of his elevated blood pressure; He is been given carvedilol 3.125 mg this morning as well as 20 mg of IV labetalol.    Troponin x 3 were 0.032, 0.025, 0.020.  MRI of head and neck showed mild bifurcation plaque but no significant stenosis.  ECHO was done that showed EF> 70%, with mild LV hypertrophy, calcification of the mitral valve leaflets, and mild  tricuspid regurgitation.  We have been consulted for his elevated troponin.     This morning his blood pressure has remained elevated at 239/112.  He did not have any chest pain or angina pectoris prior to admission.  He only came to the hospital because of his TIA symptoms.  He's not had any chest pain overnight and is currently pain-free.  He does note that he's had a little bit of shortness of breath with activity, but that's been chronic and he attributes that to his weight.  He has not had any palpitations or heart racing.  No presyncope or syncope.  No orthopnea or PND.  He's not had any lower extremity edema.  He denies any chills or fevers.        Previous Medical Testing   ECHO 06/14/2018  · Left ventricular systolic function is hyperdynamic (EF > 70%).  · Left ventricular wall thickness is consistent with mild concentric hypertrophy  · Left ventricular diastolic dysfunction is noted (grade I) consistent with impaired relaxation.  · Normal right ventricular cavity size and systolic function noted.  · Left atrial cavity size is moderately dilated.  · Saline test results are negative.  · There is moderate nodular calcification of the mitral valve leaflets (aortic sclerosis).  · Mild tricuspid valve regurgitation is present.  · Calculated right ventricular systolic pressure from tricuspid regurgitation is 38 mmHg.  · There is no evidence of pericardial effusion.    Cardiac Catheterization 12/30/2017  CONCLUSION:  1.  Normal left ventricular volume and systolic function.  2.  Moderately elevated left ventricular end-diastolic pressure  (LVEDP).  3.  No significant left main disease.  4.  Moderate proximal left anterior descending (LAD) and moderately  severe mid LAD stenosis.  5.  Moderately severe stenosis of the major obtuse marginal.  6.  Total occlusion of the right coronary artery (RCA) which was a  nondominant to codominant vessel with a large acute marginal branch.  7.  Successful angioplasty and  stenting of the right coronary artery  (RCA) with reduction of a total occlusion to no residual stenosis.   ZAINAB grade 0 flow pre and ZAINAB grade III flow post.  8.  Angiomax administration.  9.  No significant right femoral artery (RFA) disease.  10.  Successful vascular closure of the right femoral artery with  complete hemostasis.        Past Medical History:   Diagnosis Date   • Cancer    • CHF (congestive heart failure)    • Coronary artery disease    • Disease of thyroid gland    • Hyperlipidemia    • Hypertension    • Injury of back    • Melanoma    • Myocardial infarction    • Renal disorder    • Stroke        Past Surgical History:   Procedure Laterality Date   • JOINT REPLACEMENT     • LYMPHADENECTOMY           Prior to Admission medications    Medication Sig Start Date End Date Taking? Authorizing Provider   albuterol (PROAIR HFA) 108 (90 Base) MCG/ACT inhaler Inhale 2 puffs Every 4 (Four) Hours As Needed for Wheezing.   Yes Historical Provider, MD   ALPRAZolam (XANAX) 0.5 MG tablet Take 0.5 mg by mouth At Night As Needed for Anxiety.   Yes Historical Provider, MD   amLODIPine (NORVASC) 5 MG tablet Take 5 mg by mouth Daily.   Yes Historical Provider, MD   carvedilol (COREG) 3.125 MG tablet Take 3.125 mg by mouth 2 (Two) Times a Day With Meals.   Yes Historical Provider, MD   furosemide (LASIX) 40 MG tablet Take 40 mg by mouth 2 (Two) Times a Day.   Yes Historical Provider, MD   hydrALAZINE (APRESOLINE) 100 MG tablet Take 100 mg by mouth 2 (Two) Times a Day.   Yes Historical Provider, MD   levothyroxine (SYNTHROID, LEVOTHROID) 300 MCG tablet Take 300 mcg by mouth Daily.   Yes Historical Provider, MD   losartan (COZAAR) 100 MG tablet Take 100 mg by mouth Daily.   Yes Historical Provider, MD   sertraline (ZOLOFT) 50 MG tablet Take 50 mg by mouth Daily.   Yes Historical Provider, MD   traMADol (ULTRAM) 50 MG tablet Take 50 mg by mouth Every 4 (Four) Hours As Needed for Moderate Pain .   Yes Historical  Provider, MD   traZODone (DESYREL) 50 MG tablet Take 50 mg by mouth Every Night.   Yes Historical Provider, MD       Allergies   Allergen Reactions   • Penicillins Unknown (See Comments)     .       Social History     Social History   • Marital status:      Social History Main Topics   • Smoking status: Current Every Day Smoker     Packs/day: 0.50   • Smokeless tobacco: Never Used   • Alcohol use Yes      Comment: couple times a week   • Drug use: No   • Sexual activity: Defer     Other Topics Concern   • Not on file       History reviewed. No pertinent family history.    REVIEW OF SYSTEMS:   All systems reviewed.  Pertinent positives identified in HPI.  All other systems are negative.      Objective:     Vitals:    06/14/18 1634 06/14/18 2228 06/15/18 0534 06/15/18 0719   BP: (!) 191/91      BP Location: Left arm      Patient Position: Lying      Pulse: 62 62  54   Resp: 16 18  20   Temp: 97.9 °F (36.6 °C) 98 °F (36.7 °C)  98.4 °F (36.9 °C)   TempSrc: Oral Oral  Oral   SpO2: 95%   95%   Weight: 127 kg (279 lb 1.6 oz)  124 kg (272 lb 11.3 oz)    Height:         Body mass index is 41.47 kg/m².    Physical Exam:  General Appearance:    Alert, cooperative, in no acute distress   Head:    Normocephalic, without obvious abnormality, atraumatic   Eyes:            Lids and lashes normal, conjunctivae and sclerae normal, no   icterus, no pallor, corneas clear, PERRLA   Ears:    Ears appear intact with no abnormalities noted   Throat:   No oral lesions, no thrush, oral mucosa moist   Neck:   No adenopathy, supple, trachea midline, no thyromegaly, no   carotid bruit, no JVD   Back:     No kyphosis present, no scoliosis present, no skin lesions, erythema or scars, no tenderness to percussion or palpation, range of motion normal   Lungs:     Clear to auscultation,respirations regular, even and unlabored    Heart:    Regular rhythm and normal rate, normal S1 and S2, no murmur, no gallop, no rub, no click   Chest Wall:     No abnormalities observed   Abdomen:     Normal bowel sounds, no masses, no organomegaly, soft        non-tender, non-distended, no guarding, no rebound  tenderness   Extremities:   Moves all extremities well, no edema, no cyanosis, no redness   Pulses:   Pulses palpable and equal bilaterally. Normal radial, carotid, femoral, dorsalis pedis and posterior tibial pulses bilaterally. Normal abdominal aorta   Skin:  Psychiatric:   No bleeding, bruising or rash    Alert and oriented x 3, normal mood and affect         Lab Review:       Results from last 7 days  Lab Units 06/15/18  0603   SODIUM mmol/L 141   POTASSIUM mmol/L 3.9   CHLORIDE mmol/L 101   CO2 mmol/L 30.7*   BUN mg/dL 15   CREATININE mg/dL 1.01   CALCIUM mg/dL 9.3   BILIRUBIN mg/dL 0.5   ALK PHOS U/L 66   ALT (SGPT) U/L 12   AST (SGOT) U/L 14   GLUCOSE mg/dL 100*       Results from last 7 days  Lab Units 06/15/18  0603 06/14/18  2352 06/14/18  1905   TROPONIN T ng/mL 0.020 0.025 0.032*       Results from last 7 days  Lab Units 06/15/18  0603   WBC 10*3/mm3 6.37   HEMOGLOBIN g/dL 14.8   HEMATOCRIT % 45.9   PLATELETS 10*3/mm3 150       Results from last 7 days  Lab Units 06/15/18  0603 06/14/18  1047   INR  0.98 0.86*           Results from last 7 days  Lab Units 06/15/18  0603   CHOLESTEROL mg/dL 218*   TRIGLYCERIDES mg/dL 135   HDL CHOL mg/dL 54   LDL CHOL mg/dL 137*             Telemetry      EKG 06/15/2018        EKG 06/14/2018    I personally viewed and interpreted the patient's EKG/Telemetry data.      Current Facility-Administered Medications:   •  acetaminophen (TYLENOL) tablet 650 mg, 650 mg, Oral, Q6H PRN, Tre Cook MD, 650 mg at 06/15/18 0554  •  albuterol (PROVENTIL) nebulizer solution 0.083% 2.5 mg/3mL, 2.5 mg, Nebulization, Q6H PRN, Bret Wilde MD  •  ALPRAZolam (XANAX) tablet 0.5 mg, 0.5 mg, Oral, Nightly PRN, Bret Wilde MD, 0.5 mg at 06/14/18 4078  •  aluminum-magnesium hydroxide-simethicone (MAALOX MAX) 400-400-40 MG/5ML  suspension 7.5 mL, 7.5 mL, Oral, Q4H PRN, Bret Wilde MD  •  aspirin tablet 325 mg, 325 mg, Oral, Daily **OR** aspirin suppository 300 mg, 300 mg, Rectal, Daily, Bret Wilde MD  •  atorvastatin (LIPITOR) tablet 80 mg, 80 mg, Oral, Nightly, Bret Wilde MD, 80 mg at 06/14/18 2316  •  bisacodyl (DULCOLAX) suppository 10 mg, 10 mg, Rectal, Daily PRN, Bret Wilde MD  •  carvedilol (COREG) tablet 3.125 mg, 3.125 mg, Oral, BID With Meals, Bret Wilde MD, 3.125 mg at 06/15/18 0744  •  docusate sodium (COLACE) capsule 100 mg, 100 mg, Oral, BID PRN, Bret Wilde MD  •  labetalol (NORMODYNE,TRANDATE) injection 20 mg, 20 mg, Intravenous, Q6H PRN, Bret Wilde MD, 20 mg at 06/15/18 0745  •  levothyroxine (SYNTHROID, LEVOTHROID) tablet 300 mcg, 300 mcg, Oral, Daily, Bret Wilde MD, 300 mcg at 06/14/18 2316  •  ondansetron (ZOFRAN) injection 4 mg, 4 mg, Intravenous, Q6H PRN, Bret Wilde MD  •  pneumococcal polysaccharide 23-valent (PNEUMOVAX-23) vaccine 0.5 mL, 0.5 mL, Intramuscular, During Hospitalization, Bret Wilde MD  •  sertraline (ZOLOFT) tablet 50 mg, 50 mg, Oral, Daily, Bret Wilde MD, 50 mg at 06/14/18 2316  •  sodium chloride 0.9 % flush 1-10 mL, 1-10 mL, Intravenous, PRN, Bret Wilde MD  •  Insert peripheral IV, , , Once **AND** sodium chloride 0.9 % flush 10 mL, 10 mL, Intravenous, PRN, Tutu Hernandez MD  •  traZODone (DESYREL) tablet 50 mg, 50 mg, Oral, Nightly, Bret Wilde MD, 50 mg at 06/14/18 2316    Assessment and Plan:       Active Hospital Problems (** Indicates Principal Problem)    Diagnosis Date Noted   • **Transient cerebral ischemia [G45.9] 06/14/2018   • Troponin level elevated [R74.8] 06/14/2018   • Localized edema [R60.0] 06/14/2018   • Chronic heart failure [I50.9] 06/14/2018   • Essential hypertension [I10] 06/14/2018      Resolved Hospital Problems    Diagnosis Date Noted Date Resolved   No resolved problems to  display.     1.  TIA-neurology consult pending  2.  Essential hypertension- her blood pressures extremely elevated this morning, but all of his meds were held to allow permissive blood pressure control with his presentation with an acute TIA.  He has been giventhis morning; I will defer to [facility] neurology regarding blood pressure control  3.  Borderline troponin-EKG is normal and he has no symptoms.  There is no evidence of acute coronary syndrome.  I would not pursue any cardiac investigation at this time.  We will follow with you.    Homer Gibbs III, MD  06/15/18  8:38 AM

## 2018-06-15 NOTE — NURSING NOTE
Patient states he wears a CPAP at home but since he is out of town his family can not bring it in.

## 2018-06-15 NOTE — PROGRESS NOTES
" LOS: 1 day     Name: Beverly Clayton  Age: 72 y.o.  Sex: male  :  1945  MRN: [id number]         Primary Care Physician: Provider Not In System    Subjective   Subjective  Left facial and arm numbness has resolved.  Denies visual changes or headache.  Blood pressure significantly elevated overnight and this morning.  Otherwise no complaints at this time.    Objective   Vital Signs  Temp:  [97.9 °F (36.6 °C)-98.4 °F (36.9 °C)] 98.4 °F (36.9 °C)  Heart Rate:  [47-70] 47  Resp:  [16-20] 20  BP: (191-223)/() 191/103  Body mass index is 41.47 kg/m².    Objective:  General Appearance:  Comfortable and in no acute distress.    Vital signs: (most recent): Blood pressure (!) 191/103, pulse (!) 47, temperature 98.4 °F (36.9 °C), temperature source Oral, resp. rate 20, height 172.7 cm (68\"), weight 124 kg (272 lb 11.3 oz), SpO2 94 %.    Lungs:  Normal effort and normal respiratory rate.    Heart: Normal rate.  Regular rhythm.    Abdomen: Abdomen is soft.  Bowel sounds are normal.   There is no abdominal tenderness.     Extremities: There is no dependent edema or local swelling.    Neurological: Patient is alert and oriented to person, place and time.    Skin:  Warm and dry.              Results Review:       I reviewed the patient's new clinical results.      Results from last 7 days  Lab Units 06/15/18  0603 18  1047   WBC 10*3/mm3 6.37 6.94   HEMOGLOBIN g/dL 14.8 16.2   PLATELETS 10*3/mm3 150 177       Results from last 7 days  Lab Units 06/15/18  0603 18  1047   SODIUM mmol/L 141 143   POTASSIUM mmol/L 3.9 3.9   CHLORIDE mmol/L 101 102   CO2 mmol/L 30.7* 28.3   BUN mg/dL 15 16   CREATININE mg/dL 1.01 1.19   CALCIUM mg/dL 9.3 9.3   GLUCOSE mg/dL 100* 107*       Results from last 7 days  Lab Units 06/15/18  0603 18  1047   INR  0.98 0.86*             Scheduled Meds:     amLODIPine 5 mg Oral Q24H   aspirin 325 mg Oral Daily   Or      aspirin 300 mg Rectal Daily   atorvastatin 80 mg Oral Nightly "   carvedilol 3.125 mg Oral BID With Meals   hydrALAZINE 100 mg Oral Q12H   levothyroxine 300 mcg Oral Daily   sertraline 50 mg Oral Daily   traZODone 50 mg Oral Nightly     PRN Meds:   •  acetaminophen  •  albuterol  •  ALPRAZolam  •  aluminum-magnesium hydroxide-simethicone  •  bisacodyl  •  docusate sodium  •  labetalol  •  ondansetron  •  pneumococcal polysaccharide 23-valent  •  sodium chloride  •  Insert peripheral IV **AND** sodium chloride  Continuous Infusions:       Assessment/Plan   Principal Problem:    Transient cerebral ischemia  Active Problems:    Troponin level elevated    Localized edema    Chronic heart failure    Essential hypertension      Assessment & Plan    - Presenting symptoms have resolved.  MRI/MRA results reviewed.  It does not appear that he has had an acute stroke.  Awaiting neurology input.  Continue aspirin and statin.  - Cardiology input noted regarding his borderline troponin elevation.  No additional cardiac investigation planned at this time.  - We'll begin to normalize his blood pressure.  Begin adding back his oral medications this morning.    Dispo  1-2 days    Tushar Meza MD  [facility]  06/15/18  10:55 AM    Discussed with Dr. Blanca and patient's RN.  Current SBP is >200.  We will initiate cardene drip to keep SBP around 160 while oral medications are initiated.

## 2018-06-15 NOTE — PLAN OF CARE
Problem: Patient Care Overview  Goal: Plan of Care Review   06/15/18 1541   Coping/Psychosocial   Plan of Care Reviewed With patient   Plan of Care Review   Progress improving   OTHER   Outcome Summary Pt presents with functional language and motor speech. SLP to sign off.

## 2018-06-15 NOTE — PLAN OF CARE
Problem: Patient Care Overview  Goal: Plan of Care Review  Outcome: Ongoing (interventions implemented as appropriate)   06/15/18 9872   Coping/Psychosocial   Plan of Care Reviewed With patient;spouse   Plan of Care Review   Progress improving   OTHER   Outcome Summary BP controlled on cardene drip. No neuro symptoms reported.       Problem: Fall Risk (Adult)  Goal: Absence of Fall  Outcome: Ongoing (interventions implemented as appropriate)      Problem: Skin Injury Risk (Adult)  Goal: Skin Health and Integrity  Outcome: Ongoing (interventions implemented as appropriate)

## 2018-06-15 NOTE — THERAPY EVALUATION
Acute Care - Occupational Therapy Initial Evaluation  [facility]     Patient Name: Beverly Clayton  : 1945  MRN: [id number]  Today's Date: 6/15/2018             Admit Date: 2018       ICD-10-CM ICD-9-CM   1. Transient cerebral ischemia, unspecified type G45.9 435.9   2. Elevated troponin R74.8 790.6     Patient Active Problem List   Diagnosis   • Transient cerebral ischemia   • Troponin level elevated   • Localized edema   • Chronic heart failure   • Essential hypertension     Past Medical History:   Diagnosis Date   • Cancer    • CHF (congestive heart failure)    • Coronary artery disease    • Disease of thyroid gland    • Hyperlipidemia    • Hypertension    • Injury of back    • Melanoma    • Myocardial infarction    • Renal disorder    • Stroke      Past Surgical History:   Procedure Laterality Date   • JOINT REPLACEMENT     • LYMPHADENECTOMY            OT ASSESSMENT FLOWSHEET (last 72 hours)      Occupational Therapy Evaluation     Row Name 06/15/18 1024                   OT Evaluation Time/Intention    Subjective Information complains of;weakness  -LE        Document Type evaluation;therapy note (daily note)  -LE        Patient Effort good  -LE           General Information    Patient Profile Reviewed? yes  -LE        General Observations of Patient supine in bed  -LE        Prior Level of Function independent:;mod assist:   cane level, L knee brace. wife assist socks/tie shoes  -LE        Pertinent History of Current Functional Problem traveling for wedding.  L facial, UE, hand numbness  -LE        Existing Precautions/Restrictions fall  -LE        Equipment Issued to Patient cane, standard  -LE           Relationship/Environment    Primary Source of Support/Comfort spouse  -LE        Concerns About Impact on Relationships wife can provide assist  -LE           Cognitive Assessment/Intervention- PT/OT    Orientation Status (Cognition) oriented x 4  -LE        Follows Commands (Cognition)  "follows one step commands;over 90% accuracy  -LE           Bed Mobility Assessment/Treatment    Bed Mobility Assessment/Treatment supine-sit  -LE        Supine-Sit [place] (Bed Mobility) minimum assist (75% patient effort)   heavy use of bed rail.  states has rail at home  -LE        Bed Mobility, Safety Issues decreased use of arms for pushing/pulling;decreased use of legs for bridging/pushing  -LE        Comment (Bed Mobility) --   more assist as usual  -LE           Functional Mobility    Functional Mobility- Ind. Level supervision required  -LE        Functional Mobility- Device straight cane  -LE        Functional Mobility-Distance (Feet) 20  -LE        Functional Mobility- Safety Issues balance decreased during turns;sequencing ability decreased;step length decreased   pt reports \"choppy\" gait, not baseline  -LE           Transfer Assessment/Treatment    Transfer Assessment/Treatment sit-stand transfer;stand-sit transfer;toilet transfer;bed-chair transfer  -LE        Comment (Transfers) --   unsteady, close SBA  -LE        Bed-Chair [place] (Transfers) supervision  -LE        Assistive Device (Bed-Chair Transfers) cane, straight  -LE        Sit-Stand [place] (Transfers) supervision  -LE        Stand-Sit [place] (Transfers) supervision  -LE        [place] Level (Toilet Transfer) supervision  -LE        Assistive Device (Toilet Transfer) grab bars/safety frame;cane, straight  -LE           Sit-Stand Transfer    Assistive Device (Sit-Stand Transfers) cane, straight  -LE           Stand-Sit Transfer    Assistive Device (Stand-Sit Transfers) cane, straight  -LE           Toilet Transfer    Type (Toilet Transfer) stand pivot/stand step  -LE           ADL Assessment/Intervention    BADL Assessment/Intervention lower body dressing;feeding;toileting;bathing  -LE           Bathing Assessment/Intervention    Bathing [place] Level lower body;upper body;supervision  -LE        Bathing Position " supported standing;unsupported sitting  -LE           Lower Body Dressing Assessment/Training    Lower Body Dressing [place] Level don;shoes/slippers  -LE           Self-Feeding Assessment/Training    [place] Level (Feeding) independent   no difficulty  -LE           Toileting Assessment/Training    [place] Level (Toileting) minimum assist (75% patient effort)   assist to thread brief over feet  -LE        Assistive Devices (Toileting) grab bar/safety frame  -LE        Toileting Position unsupported sitting  -LE           General ROM    GENERAL ROM COMMENTS --   B UE 7/8 AROM  -LE           MMT (Manual Muscle Testing)    Additional Documentation --   B UE 4/5 except 4-/5 R shld (old surgery), denies new weakn  -LE           Sensory Assessment/Intervention    Sensory General Assessment --   numb/tingle has resolved in UE per pt  -LE           Positioning and Restraints    Pre-Treatment Position in bed  -LE        Post Treatment Position chair  -LE        In Chair notified nsg;reclined;call light within reach;encouraged to call for assist  -LE           Pain Assessment    Additional Documentation --   chronic low back pain, takes tylenol, doesn't rate  -LE           Clinical Impression (OT)    OT Diagnosis need for assist with personal care  -LE        Patient/Family Goals Statement (OT Eval) --   Return to prior level of functin  -LE        Criteria for Skilled Therapeutic Interventions Met (OT Eval) yes;treatment indicated  -LE        Rehab Potential (OT Eval) good, to achieve stated therapy goals  -LE        Therapy Frequency (OT Eval) 5 times/wk  -LE        Care Plan Review (OT) care plan/treatment goals reviewed;evaluation/treatment results reviewed  -LE        Anticipated Equipment Needs at Discharge (OT) --   cont with cane  -LE        Anticipated Discharge Disposition (OT) home with assist   rec. wife assist with ambulation since not back to baseline  -LE           Vital Signs    O2 Delivery Pre  Treatment room air  -LE        O2 Delivery Intra Treatment room air  -LE        O2 Delivery Post Treatment room air  -LE        Pre Patient Position Supine  -LE        Intra Patient Position Standing  -LE        Post Patient Position Sitting  -LE        Activity Duration --   no short of breath or gross fatigue.  agrees to sit up   -LE           Planned OT Interventions    Planned Therapy Interventions (OT Eval) BADL retraining;functional balance retraining;transfer/mobility retraining  -LE           OT Goals    Transfer Goal Selection (OT) transfer, OT goal 1  -LE        Dressing Goal Selection (OT) dressing, OT goal 1  -LE           Transfer Goal 1 (OT)    Activity/Assistive Device (Transfer Goal 1, OT) sit-to-stand/stand-to-sit;bed-to-chair/chair-to-bed;toilet;cane, straight  -LE        [place] Level/Cues Needed (Transfer Goal 1, OT) conditional independence  -LE        Time Frame (Transfer Goal 1, OT) 1 week  -LE           Dressing Goal 1 (OT)    Activity/Assistive Device (Dressing Goal 1, OT) lower body dressing;elastic laces;reacher;sock-aid   with AE education  -LE        [place]/Cues Needed (Dressing Goal 1, OT) contact guard assist  -LE        Time Frame (Dressing Goal 1, OT) 1 week  -LE          User Key  (r) = Recorded By, (t) = Taken By, (c) = Cosigned By    Initials Name Effective Dates    BHARATH Quach, OTR 06/08/18 -            Occupational Therapy Education     Title: PT OT SLP Therapies (Done)     Topic: Occupational Therapy (Done)     Point: ADL training (Done)     Description: Instruct learner(s) on proper safety adaptation and remediation techniques during self care or transfers.   Instruct in proper use of assistive devices.   Learning Progress Summary     Learner Status Readiness Method Response Comment Documented by    Patient Done MONSE Stanford,TB DU,VU Ed risk of falls and need to call for assist to get up.  Pt agrees.  Rec. wife SBA for moblity at d/c and pt agrees LE 06/15/18 1037           Point: Body mechanics (Done)     Description: Instruct learner(s) on proper positioning and spine alignment during self-care, functional mobility activities and/or exercises.   Learning Progress Summary     Learner Status Readiness Method Response Comment Documented by    Patient Done MONSE Stanford,MELCHOR CAMARILLO,LEYDA Ed risk of falls and need to call for assist to get up.  Pt agrees.  Rec. wife SBA for moblity at d/c and pt agrees LE 06/15/18 1037                      User Key     Initials Effective Dates Name Provider Type Discipline    LE 06/08/18 -  Lillie Quach, OTR Occupational Therapist OT                  OT Recommendation and Plan  Outcome Summary/Treatment Plan (OT)  Anticipated Equipment Needs at Discharge (OT):  (cont with cane)  Anticipated Discharge Disposition (OT): home with assist (rec. wife assist with ambulation since not back to baseline)  Planned Therapy Interventions (OT Eval): BADL retraining, functional balance retraining, transfer/mobility retraining  Therapy Frequency (OT Eval): 5 times/wk  Plan of Care Review  Plan of Care Reviewed With: patient  Plan of Care Reviewed With: patient  Outcome Summary: Pt presents with L side sensation changes.  While pt reports symptoms have resolved, pt is not yet back to baseline for mobility and needs SBA for walking, lower body ADL.  Pt may benefit from skilled OT to increase safety and independence.           Outcome Measures     Row Name 06/15/18 1039 06/15/18 1000          How much help from another is currently needed...    Putting on and taking off regular lower body clothing? 2  -LE  --     Bathing (including washing, rinsing, and drying) 3  -LE  --     Toileting (which includes using toilet bed pan or urinal) 2  -LE  --     Putting on and taking off regular upper body clothing 3  -LE  --     Taking care of personal grooming (such as brushing teeth) 3  -LE  --     Eating meals 4  -LE  --     Score 17  -LE  --        Functional Assessment    Outcome  Measure Options  -- AM-PAC 6 Clicks Daily Activity (OT)  -BHARATH       User Key  (r) = Recorded By, (t) = Taken By, (c) = Cosigned By    Initials Name Provider Type    DORA Narayanan Occupational Therapist          Time Calculation:   OT Start Time: 0953  OT Stop Time: 1021  OT Time Calculation (min): 28 min  Therapy Suggested Charges     Code   Minutes Charges    None           Therapy Charges for Today     Code Description Service Date Service Provider Modifiers Qty    35110701531  OT EVAL LOW COMPLEXITY 2 6/15/2018 DORA Perez GO 1    [id number]  OT SELF CARE/MGMT/TRAIN EA 15 MIN 6/15/2018 DORA Perez GO 2               DORA Perez  6/15/2018

## 2018-06-15 NOTE — PROGRESS NOTES
Vascular lab bilateral lower extremity venous doppler complete, prelim negative dvt - LILLIE Butterfield aware

## 2018-06-15 NOTE — SIGNIFICANT NOTE
06/15/18 1455   Rehab Time/Intention   Evaluation Not Performed patient unavailable for evaluation  (Attempted to see pt multiple times this date, With MD then taken to vascular. Will follow up tomorrow. )   Rehab Treatment   Discipline physical therapist   Recommendation   PT - Next Appointment 06/16/18

## 2018-06-15 NOTE — THERAPY EVALUATION
"Acute Care - Speech Language Pathology Initial Evaluation & D/C  [facility]     Patient Name: Beverly Clayton  : 1945  MRN: [id number]  Today's Date: 6/15/2018               Admit Date: 2018     Visit Dx:    ICD-10-CM ICD-9-CM   1. Transient cerebral ischemia, unspecified type G45.9 435.9   2. Elevated troponin R74.8 790.6     Patient Active Problem List   Diagnosis   • Transient cerebral ischemia   • Troponin level elevated   • Localized edema   • Chronic heart failure   • Essential hypertension     Past Medical History:   Diagnosis Date   • Cancer    • CHF (congestive heart failure)    • Coronary artery disease    • Disease of thyroid gland    • Hyperlipidemia    • Hypertension    • Injury of back    • Melanoma    • Myocardial infarction    • Renal disorder    • Stroke      Past Surgical History:   Procedure Laterality Date   • JOINT REPLACEMENT     • LYMPHADENECTOMY            SLP EVALUATION (last 72 hours)      SLP SLC Evaluation     Row Name 06/15/18 2040                   General Information    Pertinent History Of Current Problem Per unsigned neuro note acute L occipital infarct. Old R basal ganglia infarct. Pt's wife reported dysarthria, then voice change. Pt c/o anomia.   -        Precautions/Limitations, Vision corrective lenses needed for reading  -        Precautions/Limitations, Hearing hearing impairment, bilaterally;other (see comments)   \"ear wax\"  -        Prior Level of Function-Communication cognitive-linguistic impairment;other (see comments);expressive language impairment   c/o memory changes (years ago) and anomia (6 months)  -        Plans/Goals Discussed with patient;spouse/S.O.;family;agreed upon  -        Barriers to Rehab none identified  -        Patient's Goals for Discharge patient did not state  -        Family Goals for Discharge family did not state  -           Pain Assessment    Additional Documentation Pain Scale: Numbers Pre/Post-Treatment (Group)  " -           Pain Scale: Numbers Pre/Post-Treatment    Pain Scale: Numbers, Pretreatment 0/10 - no pain  -        Pain Scale: Numbers, Post-Treatment 0/10 - no pain  -           Comprehension Assessment/Intervention    Comprehension Assessment/Intervention Auditory Comprehension;Reading Comprehension  -           Auditory Comprehension Assessment/Intervention    Auditory Comprehension (Communication) [facility]  -        Able to Identify Objects/Pictures (Communication) pictures of common objects;body part;[facility]  -        Answers Questions (Communication) yes/no;personal;abstract;[facility]  -        Able to Follow Commands (Communication) 1-step;2-step;3-step;L  -        Narrative Discourse simple paragraph level;L  -        Successful Auditory Strategies (Communication) repetition  -           Reading Comprehension Assessment/Intervention    Scanning (Reading) letters;words;phrases;sentences;L  -        Single Word Level WFL  -        Phrase Level WFL  -           Expression Assessment/Intervention    Expression Assessment/Intervention verbal expression  -           Verbal Expression Assessment/Intervention    Verbal Expression [facility]  -        Automatic Speech (Communication) counting 1-20;days of week;months of year;[facility]  -        Repetition sounds;words;phrases;sentences;L  -        Phrase Completion automatic/predictable;unpredictable;L  -        Responsive Naming simple;[facility]  -        Confrontational Naming high frequency;low frequency;[facility]  -        Spontaneous/Functional Words simple;[facility]  -        Sentence Formulation simple;[facility]  -        Conversational Discourse/Fluency [facility]  -        Verbal Expression, Comment Pt also with 100% acc with synonym tasks and multiple meaning words.   -           Oral Motor Structure and Function    Oral Motor Structure and Function L  -        Dentition Assessment natural, present and adequate  -        Mucosal Quality moist, healthy   -           Oral Musculature and Cranial Nerve Assessment    Oral Motor General Assessment WFL  -SH           Motor Speech Assessment/Intervention    Motor Speech Function WFL  -        Initiation of Phonation (Communication) WFL  -SH        Automatic Speech (Communication) response to greeting;counting 1-20;days of week;months of year;WFL  -SH        Verbal Repetition (Communication) monosyllabic words;polysyllabic words;WFL;phrases;sentences  -        Phase Completion automatic/predictable;unpredictable;WFL  -SH        Conversational Speech (Communication) simple;WFL  -SH           SLP Clinical Impressions    SLP Diagnosis Pt with functional receptive/expressive language and motor speech skills.  -        Functional Impact no impact on function  -           Recommendations    Therapy Frequency (SLP SLC) evaluation only  -        Anticipated Dischage Disposition home;other (see comments)   If deficits noted upon returning to ADLs contact MD for ST  -           SLP Discharge Summary    Discharge Destination home  -        Discharge Diagnostic Statement Pt with functional performance this date. Pt reported progressive onset of memory changes and anomia. If difficulty noted upon returning home, SLP recs patient contact MD for outpatient speech therapy order.   -          User Key  (r) = Recorded By, (t) = Taken By, (c) = Cosigned By    Initials Name Effective Dates     Marilyn Morales MS CCC-SLP 03/07/18 -                EDUCATION  The patient has been educated in the following areas:     Communication Impairment.    SLP Recommendation and Plan    SLP Diagnosis: Pt with functional receptive/expressive language and motor speech skills.              Anticipated Dischage Disposition: home, other (see comments) (If deficits noted upon returning to ADLs contact MD for ST)                         Plan of Care Review  Plan of Care Reviewed With: patient  Plan of Care Reviewed With: patient  Progress:  improving  Outcome Summary: Pt presents with functional language and motor speech. SLP to sign off.                    SLP Outcome Measures (last 72 hours)      SLP Outcome Measures     Row Name 06/15/18 1500             SLP Outcome Measures    Outcome Measure Used? Adult NOMS  -         FCM Scores    FCM Chosen Spoken Language Expression  -      Spoken Language Expression FCM Score 7  -SH        User Key  (r) = Recorded By, (t) = Taken By, (c) = Cosigned By    Initials Name Effective Dates    HEAVEN Morales MS CCC-SLP 03/07/18 -               Time Calculation:           Time Calculation- SLP     Row Name 06/15/18 1542             Time Calculation- SLP    SLP Start Time 1330  -      SLP Received On 06/15/18  -        User Key  (r) = Recorded By, (t) = Taken By, (c) = Cosigned By    Initials Name Provider Type     Marilyn Morales MS CCC-SLP Speech and Language Pathologist          Therapy Charges for Today     Code Description Service Date Service Provider Modifiers Qty    76966926477 HC ST EVAL SPEECH AND PROD W LANG  5 6/15/2018 Marilyn Morales MS CCC-SLP GN 1             ADULT NOMS (last 72 hours)      Adult NOMS     Row Name 06/15/18 1500                   FCM Scores    FCM Chosen Spoken Language Expression  -        Spoken Language Expression FCM Score 7  -          User Key  (r) = Recorded By, (t) = Taken By, (c) = Cosigned By    Initials Name Effective Dates    HEAVEN Morales MS CCC-SLP 03/07/18 -                MS ELENA Morrison  6/15/2018

## 2018-06-16 PROBLEM — E03.9 HYPOTHYROIDISM: Status: ACTIVE | Noted: 2018-06-16

## 2018-06-16 LAB
ANION GAP SERPL CALCULATED.3IONS-SCNC: 10.5 MMOL/L
BASOPHILS # BLD AUTO: 0.03 10*3/MM3 (ref 0–0.2)
BASOPHILS NFR BLD AUTO: 0.4 % (ref 0–1.5)
BUN BLD-MCNC: 14 MG/DL (ref 8–23)
BUN/CREAT SERPL: 15.1 (ref 7–25)
CALCIUM SPEC-SCNC: 9.2 MG/DL (ref 8.6–10.5)
CHLORIDE SERPL-SCNC: 100 MMOL/L (ref 98–107)
CO2 SERPL-SCNC: 30.5 MMOL/L (ref 22–29)
CREAT BLD-MCNC: 0.93 MG/DL (ref 0.76–1.27)
DEPRECATED RDW RBC AUTO: 55.5 FL (ref 37–54)
EOSINOPHIL # BLD AUTO: 0.2 10*3/MM3 (ref 0–0.7)
EOSINOPHIL NFR BLD AUTO: 2.9 % (ref 0.3–6.2)
ERYTHROCYTE [DISTWIDTH] IN BLOOD BY AUTOMATED COUNT: 15.8 % (ref 11.5–14.5)
GFR SERPL CREATININE-BSD FRML MDRD: 80 ML/MIN/1.73
GLUCOSE BLD-MCNC: 104 MG/DL (ref 65–99)
GLUCOSE BLDC GLUCOMTR-MCNC: 100 MG/DL (ref 70–130)
HCT VFR BLD AUTO: 46 % (ref 40.4–52.2)
HGB BLD-MCNC: 14.9 G/DL (ref 13.7–17.6)
IMM GRANULOCYTES # BLD: 0.01 10*3/MM3 (ref 0–0.03)
IMM GRANULOCYTES NFR BLD: 0.1 % (ref 0–0.5)
LYMPHOCYTES # BLD AUTO: 1.46 10*3/MM3 (ref 0.9–4.8)
LYMPHOCYTES NFR BLD AUTO: 21.5 % (ref 19.6–45.3)
MCH RBC QN AUTO: 30.8 PG (ref 27–32.7)
MCHC RBC AUTO-ENTMCNC: 32.4 G/DL (ref 32.6–36.4)
MCV RBC AUTO: 95.2 FL (ref 79.8–96.2)
MONOCYTES # BLD AUTO: 0.47 10*3/MM3 (ref 0.2–1.2)
MONOCYTES NFR BLD AUTO: 6.9 % (ref 5–12)
NEUTROPHILS # BLD AUTO: 4.62 10*3/MM3 (ref 1.9–8.1)
NEUTROPHILS NFR BLD AUTO: 68.3 % (ref 42.7–76)
PLATELET # BLD AUTO: 169 10*3/MM3 (ref 140–500)
PMV BLD AUTO: 10 FL (ref 6–12)
POTASSIUM BLD-SCNC: 3.9 MMOL/L (ref 3.5–5.2)
RBC # BLD AUTO: 4.83 10*6/MM3 (ref 4.6–6)
SODIUM BLD-SCNC: 141 MMOL/L (ref 136–145)
T4 FREE SERPL-MCNC: 0.86 NG/DL (ref 0.93–1.7)
WBC NRBC COR # BLD: 6.78 10*3/MM3 (ref 4.5–10.7)

## 2018-06-16 PROCEDURE — 85025 COMPLETE CBC W/AUTO DIFF WBC: CPT | Performed by: INTERNAL MEDICINE

## 2018-06-16 PROCEDURE — 82962 GLUCOSE BLOOD TEST: CPT

## 2018-06-16 PROCEDURE — 80048 BASIC METABOLIC PNL TOTAL CA: CPT | Performed by: INTERNAL MEDICINE

## 2018-06-16 PROCEDURE — 99233 SBSQ HOSP IP/OBS HIGH 50: CPT | Performed by: NURSE PRACTITIONER

## 2018-06-16 PROCEDURE — 97161 PT EVAL LOW COMPLEX 20 MIN: CPT | Performed by: PHYSICAL THERAPIST

## 2018-06-16 PROCEDURE — 84439 ASSAY OF FREE THYROXINE: CPT | Performed by: INTERNAL MEDICINE

## 2018-06-16 PROCEDURE — 99233 SBSQ HOSP IP/OBS HIGH 50: CPT | Performed by: INTERNAL MEDICINE

## 2018-06-16 PROCEDURE — 97110 THERAPEUTIC EXERCISES: CPT | Performed by: PHYSICAL THERAPIST

## 2018-06-16 RX ORDER — CARVEDILOL 12.5 MG/1
12.5 TABLET ORAL 2 TIMES DAILY WITH MEALS
Status: DISCONTINUED | OUTPATIENT
Start: 2018-06-16 | End: 2018-06-17 | Stop reason: HOSPADM

## 2018-06-16 RX ORDER — LOSARTAN POTASSIUM 100 MG/1
100 TABLET ORAL
Status: DISCONTINUED | OUTPATIENT
Start: 2018-06-16 | End: 2018-06-17 | Stop reason: HOSPADM

## 2018-06-16 RX ORDER — FUROSEMIDE 40 MG/1
40 TABLET ORAL DAILY
Status: DISCONTINUED | OUTPATIENT
Start: 2018-06-16 | End: 2018-06-17 | Stop reason: HOSPADM

## 2018-06-16 RX ADMIN — ASPIRIN 81 MG: 81 TABLET ORAL at 08:49

## 2018-06-16 RX ADMIN — CARVEDILOL 3.12 MG: 3.12 TABLET, FILM COATED ORAL at 08:49

## 2018-06-16 RX ADMIN — LEVOTHYROXINE SODIUM 300 MCG: 150 TABLET ORAL at 08:49

## 2018-06-16 RX ADMIN — ALPRAZOLAM 0.5 MG: 0.5 TABLET ORAL at 20:44

## 2018-06-16 RX ADMIN — HYDRALAZINE HYDROCHLORIDE 100 MG: 50 TABLET, FILM COATED ORAL at 08:48

## 2018-06-16 RX ADMIN — HYDRALAZINE HYDROCHLORIDE 100 MG: 50 TABLET, FILM COATED ORAL at 20:44

## 2018-06-16 RX ADMIN — NICARDIPINE HYDROCHLORIDE 5 MG/HR: 2.5 INJECTION INTRAVENOUS at 07:40

## 2018-06-16 RX ADMIN — ACETAMINOPHEN 650 MG: 325 TABLET, FILM COATED ORAL at 20:43

## 2018-06-16 RX ADMIN — ACETAMINOPHEN 650 MG: 325 TABLET, FILM COATED ORAL at 06:46

## 2018-06-16 RX ADMIN — TRAZODONE HYDROCHLORIDE 50 MG: 50 TABLET ORAL at 20:44

## 2018-06-16 RX ADMIN — ATORVASTATIN CALCIUM 40 MG: 20 TABLET, FILM COATED ORAL at 20:44

## 2018-06-16 RX ADMIN — LOSARTAN POTASSIUM 100 MG: 100 TABLET, FILM COATED ORAL at 11:39

## 2018-06-16 RX ADMIN — AMLODIPINE BESYLATE 5 MG: 5 TABLET ORAL at 08:49

## 2018-06-16 RX ADMIN — Medication 1000 MCG: at 08:49

## 2018-06-16 RX ADMIN — CARVEDILOL 12.5 MG: 12.5 TABLET, FILM COATED ORAL at 16:25

## 2018-06-16 RX ADMIN — CLOPIDOGREL 75 MG: 75 TABLET, FILM COATED ORAL at 08:49

## 2018-06-16 RX ADMIN — SERTRALINE 50 MG: 50 TABLET, FILM COATED ORAL at 08:49

## 2018-06-16 RX ADMIN — FUROSEMIDE 40 MG: 40 TABLET ORAL at 12:26

## 2018-06-16 NOTE — PROGRESS NOTES
Hospital Follow Up    LOS:  LOS: 2 days   Patient Name: Beverly Clayton  Age/Sex: 72 y.o. male  : 1945  MRN: [id number]    Day of Service: 18   Length of Stay: 2  Encounter Provider: Maciel Maria MD  Place of Service: [facility] CARDIOLOGY  Patient Care Team:  Provider Not In System as PCP - General    Subjective:     Chief Complaint: Hypertension.    Interval History: Patient doing okay blood pressure is improving still on a Cardene drip    Objective:     Objective:  Temp:  [97.6 °F (36.4 °C)-98 °F (36.7 °C)] 97.6 °F (36.4 °C)  Heart Rate:  [48-70] 55  Resp:  [18] 18  BP: (135-209)/() 160/73     Intake/Output Summary (Last 24 hours) at 18 1052  Last data filed at 18 0908   Gross per 24 hour   Intake             1255 ml   Output              400 ml   Net              855 ml     Body mass index is 42.92 kg/m².  1    18  1634 06/15/18  0534 18  0500   Weight: 127 kg (279 lb 1.6 oz) 124 kg (272 lb 11.3 oz) 128 kg (282 lb 3 oz)     Weight change: 5.529 kg (12 lb 3 oz)      Physical Exam:   General : Alert, cooperative, in no acute distress.  Neuro: alert,cooperative and oriented  Lungs: CTAB. Normal respiratory effort and rate.  CV:: Regular rate and rhythm, normal S1 and S2, no murmurs, gallops or rubs.  ABD: Soft, nontender, non-distended. positive bowel sounds  Extr: No edema or cyanosis, moves all extremities    Lab Review:     Results from last 7 days  Lab Units 18  0623 06/15/18  0603 18  1047   SODIUM mmol/L 141 141 143   POTASSIUM mmol/L 3.9 3.9 3.9   CHLORIDE mmol/L 100 101 102   CO2 mmol/L 30.5* 30.7* 28.3   BUN mg/dL 14 15 16   CREATININE mg/dL 0.93 1.01 1.19   GLUCOSE mg/dL 104* 100* 107*   CALCIUM mg/dL 9.2 9.3 9.3   AST (SGOT) U/L  --  14 15   ALT (SGPT) U/L  --  12 16       Results from last 7 days  Lab Units 06/15/18  0603 18  2352 18  1905 18  1047   TROPONIN T ng/mL 0.020 0.025 0.032*  0.035*       Results from last 7 days  Lab Units 06/16/18  0623 06/15/18  0603   WBC 10*3/mm3 6.78 6.37   HEMOGLOBIN g/dL 14.9 14.8   HEMATOCRIT % 46.0 45.9   PLATELETS 10*3/mm3 169 150       Results from last 7 days  Lab Units 06/15/18  0603 06/14/18  1047   INR  0.98 0.86*           Results from last 7 days  Lab Units 06/15/18  0603   CHOLESTEROL mg/dL 218*   TRIGLYCERIDES mg/dL 135   HDL CHOL mg/dL 54           Results from last 7 days  Lab Units 06/15/18  0603   TSH mIU/mL >100.000*     I reviewed the patient's new clinical results.  I personally viewed and interpreted the patient's EKG  Current Medications:   Scheduled Meds:  amLODIPine 5 mg Oral Q24H   aspirin 81 mg Oral Daily   atorvastatin 40 mg Oral Nightly   carvedilol 3.125 mg Oral BID With Meals   clopidogrel 75 mg Oral Daily   furosemide 40 mg Oral Daily   hydrALAZINE 100 mg Oral Q12H   levothyroxine 300 mcg Oral Daily   losartan 100 mg Oral Q24H   sertraline 50 mg Oral Daily   traZODone 50 mg Oral Nightly   vitamin B-12 1,000 mcg Oral Daily     Continuous Infusions:  niCARdipine 5-15 mg/hr Last Rate: 5 mg/hr (06/16/18 0740)       Allergies:  Allergies   Allergen Reactions   • Penicillins Unknown (See Comments)     .       Assessment:     Principal Problem:    Transient cerebral ischemia  Active Problems:    Troponin level elevated    Localized edema    Chronic heart failure    Essential hypertension        Plan:     1.  Hypertension.  Will increase carvedilol to 12.5 twice a day continue to follow hopefully can wean off Cardene.      2.  TIA neuro following.  3.  Troponin has not changed.  Minimal troponin increase could be from neurologic event no chest pains.  4.  Will follow  Maciel Maria MD  06/16/18  10:52 AM

## 2018-06-16 NOTE — DISCHARGE INSTRUCTIONS
Must get Long-term (30 day) cardiac monitor after discharge.     Dual Oral Antiplatelet Therapy (DOAP) Aspirin 81mg and Plavix 75mg daily x 30 days then Plavix alone.   Atorvastatin as written.     Ideal targets for stroke prevention would be :  Blood pressure < 130/80; B12 > 500 TSH in normal range and LDL < 70; HbA1c < 6.5 and smoking cessation if applicable.

## 2018-06-16 NOTE — PLAN OF CARE
Problem: Stroke (Ischemic) (Adult)  Goal: Signs and Symptoms of Listed Potential Problems Will be Absent, Minimized or Managed (Stroke)  Outcome: Ongoing (interventions implemented as appropriate)      Problem: Patient Care Overview  Goal: Plan of Care Review  Outcome: Ongoing (interventions implemented as appropriate)   06/16/18 1519   Coping/Psychosocial   Plan of Care Reviewed With patient   Plan of Care Review   Progress improving   OTHER   Outcome Summary NIH=0, ambulated to BR w/gait belt X 2 assist, uses cane for balance and wears LK brace for stability. No pain reported. SBP 170s. Continue to monitor.     Goal: Individualization and Mutuality  Outcome: Ongoing (interventions implemented as appropriate)      Problem: Fall Risk (Adult)  Goal: Absence of Fall  Outcome: Ongoing (interventions implemented as appropriate)   06/16/18 1519   Fall Risk (Adult)   Absence of Fall making progress toward outcome       Problem: Skin Injury Risk (Adult)  Goal: Skin Health and Integrity  Outcome: Ongoing (interventions implemented as appropriate)   06/16/18 1519   Skin Injury Risk (Adult)   Skin Health and Integrity making progress toward outcome

## 2018-06-16 NOTE — THERAPY EVALUATION
Acute Care - Physical Therapy Initial Evaluation  [facility]     Patient Name: Beverly Clayton  : 1945  MRN: [id number]  Today's Date: 2018   Onset of Illness/Injury or Date of Surgery: 18  Date of Referral to PT: 18  Referring Physician: Dr Meza      Admit Date: 2018    Visit Dx:     ICD-10-CM ICD-9-CM   1. Transient cerebral ischemia, unspecified type G45.9 435.9   2. Elevated troponin R74.8 790.6     Patient Active Problem List   Diagnosis   • Transient cerebral ischemia   • Troponin level elevated   • Localized edema   • Chronic heart failure   • Essential hypertension     Past Medical History:   Diagnosis Date   • Cancer    • CHF (congestive heart failure)    • Coronary artery disease    • Disease of thyroid gland    • Hyperlipidemia    • Hypertension    • Injury of back    • Melanoma    • Myocardial infarction    • Renal disorder    • Stroke      Past Surgical History:   Procedure Laterality Date   • JOINT REPLACEMENT     • LYMPHADENECTOMY          PT ASSESSMENT (last 12 hours)      Physical Therapy Evaluation     Row Name 18 0934          PT Evaluation Time/Intention    Subjective Information complains of;weakness;pain   knee pain w/ WB, back stiffness  -RA     Document Type evaluation  -RA     Patient Effort good  -RA     Row Name 18 0934          General Information    Patient Profile Reviewed? yes  -RA     Onset of Illness/Injury or Date of Surgery 18  -RA     Referring Physician Dr Meza  -RA     Patient Observations alert;cooperative;agree to therapy  -RA     Patient/Family Observations spouse at bedside  -RA     General Observations of Patient supine in bed  -RA     Prior Level of Function independent:   gait with cane, R knee brace, spouse assist w/ shoes/socks  -RA     Equipment Currently Used at Home cane, straight;bipap/ cpap  -RA     Pertinent History of Current Functional Problem Traveling through on way to wedding in [place] include 234 beds at the [facility], when had  "onset of head pressure, L face/UE numbness, L UE/LE weakness  -RA     Existing Precautions/Restrictions fall  -RA     Equipment Issued to Patient cane, standard  -RA     Row Name 06/16/18 0934          Relationship/Environment    Primary Source of Support/Comfort spouse  -RA     Row Name 06/16/18 0934          Cognitive Assessment/Intervention- PT/OT    Orientation Status (Cognition) oriented x 4  -RA     Follows Commands (Cognition) follows one step commands  -RA     Row Name 06/16/18 0934          Bed Mobility Assessment/Treatment    Bed Mobility Assessment/Treatment supine-sit  -RA     Supine-Sit [place] (Bed Mobility) minimum assist (75% patient effort)  -RA     Bed Mobility, Safety Issues decreased use of arms for pushing/pulling;decreased use of legs for bridging/pushing  -RA     Comment (Bed Mobility) hx of R shoulder surgery with weakness noted  -RA     Row Name 06/16/18 0934          Transfer Assessment/Treatment    Transfer Assessment/Treatment sit-stand transfer;stand-sit transfer  -RA     Comment (Transfers) UE push required   -RA     Sit-Stand [place] (Transfers) supervision  -RA     Stand-Sit [place] (Transfers) supervision  -RA     Row Name 06/16/18 0934          Sit-Stand Transfer    Assistive Device (Sit-Stand Transfers) cane, straight  -RA     Row Name 06/16/18 0934          Stand-Sit Transfer    Assistive Device (Stand-Sit Transfers) cane, straight  -RA     Row Name 06/16/18 0934          Gait/Stairs Assessment/Training    [place] Level (Gait) contact guard;stand by assist  -RA     Assistive Device (Gait) cane, straight  -RA     Distance in Feet (Gait) 20  -RA     Deviations/Abnormal Patterns (Gait) brenda decreased;base of support, wide;stride length decreased;gait speed decreased   patient describes as \"choppy\" - not normal  -RA     Bilateral Gait Deviations forward flexed posture;heel strike decreased  -RA     Ascending Technique (Stairs) step-to-step  -RA     Descending " Technique (Stairs) step-to-step  -RA     Comment (Gait/Stairs) HR support and cane required for stairs  -RA     Row Name 06/16/18 0934          General ROM    GENERAL ROM COMMENTS WFL  -RA     Row Name 06/16/18 0934          General Assessment (Manual Muscle Testing)    Comment, General Manual Muscle Testing (MMT) Assessment B UE/LE strength >/= 4/5, mild R hip flex weakness noted, B hip extension grossly 4-/5  -RA     Row Name 06/16/18 0934          Therapeutic Exercise    62400 - PT Therapeutic Exercise Minutes 8  -RA     Row Name 06/16/18 0934          Therapeutic Exercise    Lower Extremity (Therapeutic Exercise) LAQ (long arc quad), bilateral;marching while seated;heel slides, bilateral   AP seated (or supine), hip abd supine  -RA     Row Name 06/16/18 0934          Pain Assessment    Additional Documentation --   stiffness in back   -RA     Row Name 06/16/18 0934          Pain Scale: Word Pre/Post-Treatment    Pain: Word Scale, Pretreatment 2 - mild pain   L knee with WB   -RA     Row Name 06/16/18 0934          Physical Therapy Clinical Impression    Date of Referral to PT 06/14/18  -RA     PT Diagnosis (PT Clinical Impression) impaired mobility and gait  -RA     Criteria for Skilled Interventions Met (PT Clinical Impression) yes;treatment indicated  -RA     Pathology/Pathophysiology Noted (Describe Specifically for Each System) neuromuscular;musculoskeletal  -RA     Impairments Found (describe specific impairments) gait, locomotion, and balance  -RA     Rehab Potential (PT Clinical Summary) good, to achieve stated therapy goals  -RA     Row Name 06/16/18 0934          Vital Signs    Pre Systolic BP Rehab --   179/97  -RA     O2 Delivery Pre Treatment supplemental O2  -RA     O2 Delivery Intra Treatment room air  -RA     O2 Delivery Post Treatment supplemental O2  -RA     Pre Patient Position Supine  -RA     Intra Patient Position Standing  -RA     Post Patient Position Supine  -RA     Activity Duration --    No SOA, O2 sats >/= 93%  -RA     Row Name 06/16/18 0934          Physical Therapy Goals    Bed Mobility Goal Selection (PT) bed mobility, PT goal 1  -RA     Transfer Goal Selection (PT) transfer, PT goal 1  -RA     Gait Training Goal Selection (PT) gait training, PT goal 1  -RA     Stairs Goal Selection (PT) stairs, PT goal 1  -RA     Additional Documentation Stairs Goal Selection (PT) (Row)  -RA     Row Name 06/16/18 0934          Bed Mobility Goal 1 (PT)    Activity/Assistive Device (Bed Mobility Goal 1, PT) sit to supine/supine to sit  -RA     [place] Level/Cues Needed (Bed Mobility Goal 1, PT) supervision required;conditional independence  -RA     Time Frame (Bed Mobility Goal 1, PT) 5 days  -RA     Row Name 06/16/18 0934          Transfer Goal 1 (PT)    Activity/Assistive Device (Transfer Goal 1, PT) transfers, all  -RA     [place] Level/Cues Needed (Transfer Goal 1, PT) supervision required;conditional independence  -RA     Time Frame (Transfer Goal 1, PT) 5 days  -RA     Row Name 06/16/18 0934          Gait Training Goal 1 (PT)    Activity/Assistive Device (Gait Training Goal 1, PT) gait (walking locomotion);assistive device use;cane, straight  -RA     [place] Level (Gait Training Goal 1, PT) supervision required;conditional independence  -RA     Distance (Gait Goal 1, PT) 150  -RA     Row Name 06/16/18 0934          Stairs Goal 1 (PT)    Activity/Assistive Device (Stairs Goal 1, PT) stairs, all skills;cane, straight;using handrail, left;using handrail, right   using HR/cane as appropriate  -RA     [place] Level/Cues Needed (Stairs Goal 1, PT) contact guard assist;standby assist  -RA     Number of Stairs (Stairs Goal 1, PT) 4  -RA     Time Frame (Stairs Goal 1, PT) 5 days  -RA     Row Name 06/16/18 0934          Patient Education Goal (PT)    Activity (Patient Education Goal, PT) LE exercises   -RA     [place]/Cues/Accuracy (Memory Goal 2, PT) independent;demonstrates adequately   -RA     Time Frame (Patient Education Goal, PT) 5 days  -RA     Row Name 06/16/18 0934          Positioning and Restraints    Pre-Treatment Position in bed  -RA     Post Treatment Position bed  -RA     In Bed supine;call light within reach;encouraged to call for assist;with family/caregiver;SCD pump applied  -RA       User Key  (r) = Recorded By, (t) = Taken By, (c) = Cosigned By    Initials Name Provider Type    RA Candy Harris PT Physical Therapist          Physical Therapy Education     Title: PT OT SLP Therapies (Done)     Topic: Physical Therapy (Done)     Point: Mobility training (Done)    Learning Progress Summary     Learner Status Readiness Method Response Comment Documented by    Patient Done Acceptance E,[facility] 06/16/18 0934          Point: Home exercise program (Done)    Learning Progress Summary     Learner Status Readiness Method Response Comment Documented by    Patient Done Acceptance E,[facility] 06/16/18 0934          Point: Body mechanics (Done)    Learning Progress Summary     Learner Status Readiness Method Response Comment Documented by    Patient Done Acceptance E,[facility] 06/16/18 0934          Point: Precautions (Done)    Learning Progress Summary     Learner Status Readiness Method Response Comment Documented by    Patient Done Acceptance E,[facility] 06/16/18 0934                      User Key     Initials Effective Dates Name Provider Type [facility] 04/06/17 -  Candy Harris PT Physical Therapist PT                PT Recommendation and Plan  Planned Therapy Interventions (PT Eval): bed mobility training, gait training, home exercise program, strengthening, transfer training, stair training, patient/family education  Therapy Frequency (PT Clinical Impression): daily  Plan of Care Reviewed With: patient, spouse  Outcome Summary: Patient admitted with TIA and exhibits change in baseline functional mobility/gait.  He will benefit from skilled therapy to aid in return to prior  level of function.          Outcome Measures     Row Name 06/16/18 1000 06/15/18 1039 06/15/18 1000       How much help from another person do you currently need...    Turning from your back to your side while in flat bed without using bedrails? 3  -RA  --  --    Moving from lying on back to sitting on the side of a flat bed without bedrails? 3  -RA  --  --    Moving to and from a bed to a chair (including a wheelchair)? 3  -RA  --  --    Standing up from a chair using your arms (e.g., wheelchair, bedside chair)? 3  -RA  --  --    Climbing 3-5 steps with a railing? 2  -RA  --  --    To walk in hospital room? 3  -RA  --  --    AM-PAC 6 Clicks Score 17  -RA  --  --       How much help from another is currently needed...    Putting on and taking off regular lower body clothing?  -- 2  -LE  --    Bathing (including washing, rinsing, and drying)  -- 3  -LE  --    Toileting (which includes using toilet bed pan or urinal)  -- 2  -LE  --    Putting on and taking off regular upper body clothing  -- 3  -LE  --    Taking care of personal grooming (such as brushing teeth)  -- 3  -LE  --    Eating meals  -- 4  -LE  --    Score  -- 17  -LE  --       Functional Assessment    Outcome Measure Options AM-PAC 6 Clicks Basic Mobility (PT)  -RA  -- AM-PAC 6 Clicks Daily Activity (OT)  -LE      User Key  (r) = Recorded By, (t) = Taken By, (c) = Cosigned By    Initials Name Provider Type    LUIS FELIPE Harris, PT Physical Therapist    BHARATH Quach, OTR Occupational Therapist           Time Calculation:         PT Charges     Row Name 06/16/18 1004 06/16/18 0934          Time Calculation    Start Time 0903  -RA  --     Stop Time 0932  -RA  --     Time Calculation (min) 29 min  -RA  --        Time Calculation- PT    Total Timed Code Minutes- PT 8 minute(s)  -RA  --        Timed Charges    39435 - PT Therapeutic Exercise Minutes  -- 8  -RA       User Key  (r) = Recorded By, (t) = Taken By, (c) = Cosigned By    Initials Name Provider Type     LUIS FELIPE Harris, PT Physical Therapist        Therapy Suggested Charges     Code   Minutes Charges    92755 (CPT®) Hc Pt Neuromusc Re Education Ea 15 Min      75225 (CPT®) Hc Pt Ther Proc Ea 15 Min 8 1    96712 (CPT®) Hc Gait Training Ea 15 Min      06301 (CPT®) Hc Pt Therapeutic Act Ea 15 Min      24904 (CPT®) Hc Pt Manual Therapy Ea 15 Min      19221 (CPT®) Hc Pt Iontophoresis Ea 15 Min      00254 (CPT®) Hc Pt Elec Stim Ea-Per 15 Min      80744 (CPT®) Hc Pt Ultrasound Ea 15 Min      08886 (CPT®) Hc Pt Self Care/Mgmt/Train Ea 15 Min      Total  8 1        Therapy Charges for Today     Code Description Service Date Service Provider Modifiers Qty    63044066053 HC PT THER PROC EA 15 MIN 6/16/2018 Candy Harris, PT GP 1    81103302652 HC PT EVAL LOW COMPLEXITY 2 6/16/2018 Candy Harris, PT GP 1          PT G-Codes  Outcome Measure Options: AM-PAC 6 Clicks Basic Mobility (PT)      Candy Harris, PT  6/16/2018

## 2018-06-16 NOTE — PROGRESS NOTES
" LOS: 2 days     Name: Beverly Clayton  Age: 72 y.o.  Sex: male  :  1945  MRN: [id number]         Primary Care Physician: Provider Not In System    Subjective   Subjective  No complaints today.  No recurrence of left facial or arm numbness.  Feeling back to baseline.  He admits that he has not been totally compliant with his outpatient medications.    Objective   Vital Signs  Temp:  [97.6 °F (36.4 °C)-98 °F (36.7 °C)] 97.6 °F (36.4 °C)  Heart Rate:  [52-70] 54  Resp:  [18] 18  BP: (135-209)/() 154/76  Body mass index is 42.92 kg/m².    Objective:  General Appearance:  Comfortable and in no acute distress.    Vital signs: (most recent): Blood pressure 154/76, pulse 54, temperature 97.6 °F (36.4 °C), temperature source Oral, resp. rate 18, height 172.7 cm (68\"), weight 128 kg (282 lb 3 oz), SpO2 98 %.    Lungs:  Normal effort and normal respiratory rate.    Heart: Normal rate.  Regular rhythm.    Abdomen: Abdomen is soft.  Bowel sounds are normal.   There is no abdominal tenderness.     Extremities: There is no dependent edema or local swelling.    Neurological: Patient is alert and oriented to person, place and time.    Skin:  Warm and dry.              Results Review:       I reviewed the patient's new clinical results.      Results from last 7 days  Lab Units 18  0623 06/15/18  0603 18  1047   WBC 10*3/mm3 6.78 6.37 6.94   HEMOGLOBIN g/dL 14.9 14.8 16.2   PLATELETS 10*3/mm3 169 150 177       Results from last 7 days  Lab Units 18  0623 06/15/18  0603 18  1047   SODIUM mmol/L 141 141 143   POTASSIUM mmol/L 3.9 3.9 3.9   CHLORIDE mmol/L 100 101 102   CO2 mmol/L 30.5* 30.7* 28.3   BUN mg/dL 14 15 16   CREATININE mg/dL 0.93 1.01 1.19   CALCIUM mg/dL 9.2 9.3 9.3   GLUCOSE mg/dL 104* 100* 107*       Results from last 7 days  Lab Units 06/15/18  0603 18  1047   INR  0.98 0.86*             Scheduled Meds:     amLODIPine 5 mg Oral Q24H   aspirin 81 mg Oral Daily   atorvastatin 40 " mg Oral Nightly   carvedilol 12.5 mg Oral BID With Meals   clopidogrel 75 mg Oral Daily   furosemide 40 mg Oral Daily   hydrALAZINE 100 mg Oral Q12H   levothyroxine 300 mcg Oral Daily   losartan 100 mg Oral Q24H   sertraline 50 mg Oral Daily   traZODone 50 mg Oral Nightly   vitamin B-12 1,000 mcg Oral Daily     PRN Meds:   •  acetaminophen  •  albuterol  •  ALPRAZolam  •  aluminum-magnesium hydroxide-simethicone  •  bisacodyl  •  docusate sodium  •  labetalol  •  ondansetron  •  pneumococcal polysaccharide 23-valent  •  sodium chloride  •  Insert peripheral IV **AND** sodium chloride  Continuous Infusions:    niCARdipine 5-15 mg/hr Last Rate: 5 mg/hr (06/16/18 0740)       Assessment/Plan   Principal Problem:    Transient cerebral ischemia  Active Problems:    Troponin level elevated    Localized edema    Chronic heart failure    Essential hypertension  Hypothyroidism    Assessment & Plan    - Blood pressure now improved.  He has been weaned off of Cardene drip.  Home medication regimen has been reintroduced in its entirety for better blood pressure control.  The patient admits to noncompliance with his medication regimen which will be a barrier to his well-being moving forward.  I counseled him extensively on this today.  - We will monitor his blood pressure over the course of the day today and if remains controlled then he could potentially be discharged tomorrow.  - Discussed with neurology today.  He will remain on dual antiplatelet therapy for the next 30 days and then transitioned to Plavix alone after that.  He will need follow-up with his primary care physician in [place] and at that time we recommend that long-term outpatient cardiac monitoring be ordered.  - With regards to his hypothyroidism, he also admits to noncompliance with his Synthroid.  I am able to see his primary care notes and lab work from his physician in [place] and this appears to be a chronic issue with poor control over the long-term.  We  have restarted his Synthroid and I have strongly suggested he return to see his primary care physician soon to further treat this issue.    Dispo  Possible discharge tomorrow    Greater than 50% of my time today was spent counseling the patient on his current medical condition, noncompliance and achieving better health.    Tushar Meza MD  [facility]  06/16/18  11:25 AM

## 2018-06-16 NOTE — PROGRESS NOTES
Patient admitted with hypertensive encephalopathy versus TIA.  No stroke noted on MRI.  MRA demonstrated very mild carotid occlusive disease.  Duplex scan yesterday confirmed less than 50% stenosis bilaterally.    With this minimal disease recommend medical therapy only.  Dr. Fried discussed this with Dr. Blanca yesterday.  We'll leave further management to the neurology service.

## 2018-06-16 NOTE — PLAN OF CARE
Problem: Patient Care Overview  Goal: Plan of Care Review   06/16/18 0934   Coping/Psychosocial   Plan of Care Reviewed With patient;spouse   OTHER   Outcome Summary Patient admitted with TIA and exhibits change in baseline functional mobility/gait. He will benefit from skilled therapy to aid in return to prior level of function.

## 2018-06-16 NOTE — PROGRESS NOTES
"DOS: 2018  NAME: Beverly Clayton   : 1945  PCP: Provider Not In System  Chief Complaint   Patient presents with   • Dizziness     left side face, hand, arm numb.  hx stroke     Stroke    Subjective: BP much better controlled overnight; Cardene drip currently off. No complaints and/or concerns on my exam today.    Patient denies HA, double/blurred vision, dizziness, numbness or loss of sensation or any other new neurological complaints at this time.     Objective:  Vital signs: /73   Pulse 55   Temp 97.6 °F (36.4 °C) (Oral)   Resp 18   Ht 172.7 cm (68\")   Wt 128 kg (282 lb 3 oz)   SpO2 96%   BMI 42.92 kg/m²       HEENT: Normocephalic, atraumatic   COR: RRR  Resp: Even and unlabored  Extremities: Equal pulses, non distal embolization    Neurological:   MS: AO. Language normal. No neglect. Higher integrative function normal  CN: II-XII normal  Motor: 5/5, normal tone  Sensory: Intact  Coordination: Normal (finger to nose and heel to shin)     Laboratory results:  Lab Results   Component Value Date    GLUCOSE 104 (H) 2018    CALCIUM 9.2 2018     2018    K 3.9 2018    CO2 30.5 (H) 2018     2018    BUN 14 2018    CREATININE 0.93 2018    EGFRIFNONA 80 2018    BCR 15.1 2018    ANIONGAP 10.5 2018     Lab Results   Component Value Date    WBC 6.78 2018    HGB 14.9 2018    HCT 46.0 2018    MCV 95.2 2018     2018     Lab Results   Component Value Date    CHOL 218 (H) 06/15/2018     Lab Results   Component Value Date    HDL 54 06/15/2018     Lab Results   Component Value Date     (H) 06/15/2018     Lab Results   Component Value Date    TRIG 135 06/15/2018     Lab Results   Component Value Date    TSH >100.000 (H) 06/15/2018     Lab Results   Component Value Date    JVTRPJLZ19 284 06/15/2018     Lab Results   Component Value Date    HGBA1C 5.30 06/15/2018     Lab Results   Component " Value Date    CHOL 218 (H) 06/15/2018    TRIG 135 06/15/2018    HDL 54 06/15/2018     (H) 06/15/2018       Review and interpretation of imaging:  MRA Head   IMPRESSION:  1. Limited but grossly unremarkable exam. Follow-up as above if  indicated       MRA Neck   IMPRESSION:  1.  Mild bifurcation region plaque, right greater than left, but without  significant stenosis.  2. Areas of stenosis of the proximal left vertebral as discussed.       MRI Brain   IMPRESSION:  1. Atrophy with chronic-appearing changes as discussed. No acute finding  or abnormal enhancement           TTE   Interpretation Summary     · Left ventricular systolic function is hyperdynamic (EF > 70%).  · Left ventricular wall thickness is consistent with mild concentric hypertrophy  · Left ventricular diastolic dysfunction is noted (grade I) consistent with impaired relaxation.  · Normal right ventricular cavity size and systolic function noted.  · Left atrial cavity size is moderately dilated.  · Saline test results are negative.  · There is moderate nodular calcification of the mitral valve leaflets (aortic sclerosis).  · Mild tricuspid valve regurgitation is present.  · Calculated right ventricular systolic pressure from tricuspid regurgitation is 38 mmHg.  · There is no evidence of pericardial effusion.          Impression: This is a 73 yo male w/ congestive heart failure coronary artery disease, hypertension, dyslipidemia, melanoma, myocardial infarction and  stroke in the past who presented to [facility] on 06/14 from out of town w/ complaint of transient left facial numbness associated with arm numbness which resolved within a few minutes. On arrival pt. BP was elevated (197/107).  MRI/MRA (above) revealed small right hemispheric embolic CVA and recrudescence of old CVA thought to be from uncontrolled HTN. TTE revealed an EF >70% moderately dilated LA and normal LV.     Neurologically patient unchanged; stable. We recommend DOAP(ASA 81mg/Plavix  75mg) x 30 days; then Plavix along. Follow-up w/ Neurology in 90 days in [place]. OP long-term cardiac monitor recommended; statin as written. Please obtain all images on disk prior to discharge along w/ discharge summary. No further neurology workup indicated. We will sign off and see again upon request.        Plan:  -Please obtain all images on disk prior to discharge along w/ discharge summary   -Stroke labs (results above)   - MRI brain (results above)   - Telemetry   - PTOT speech rehabilitation assessment  - DVT prophylaxis  - Swallow study (completed)   - Statins  - Echocardiogram (completed)   - Start dual Antiplatelet therapy ×30 days and after that Plavix alone.   - follow up with neurology as OP   - Continue B-12 as written   -Follow-up with PCP for order for order for OP LT cardiac monitor (30 day)       Ideal targets for stroke prevention would be :  Blood pressure < 130/80; B12 > 500 TSH in normal range and LDL < 70; HbA1c < 6.5 and smoking cessation if applicable.      Case reviewed with Dr. Blanca and he agrees with plan above.   Discussed plan w/ Dr. Meza; potential discharge home tomorrow.

## 2018-06-16 NOTE — PLAN OF CARE
Problem: Stroke (Ischemic) (Adult)  Goal: Signs and Symptoms of Listed Potential Problems Will be Absent, Minimized or Managed (Stroke)  Outcome: Ongoing (interventions implemented as appropriate)      Problem: Patient Care Overview  Goal: Plan of Care Review  Outcome: Ongoing (interventions implemented as appropriate)   06/16/18 0615   Coping/Psychosocial   Plan of Care Reviewed With patient   Plan of Care Review   Progress improving   OTHER   Outcome Summary Pt alert & oriented. VSS. BP controlled with Cardene gtt. Gtt stopped @ 0239 with BP maintaining in the 150's. Denies pain or dsicomfort. Wife remains at bedside. WIll continue to monitor.       Problem: Fall Risk (Adult)  Goal: Absence of Fall  Outcome: Ongoing (interventions implemented as appropriate)      Problem: Skin Injury Risk (Adult)  Goal: Skin Health and Integrity  Outcome: Ongoing (interventions implemented as appropriate)

## 2018-06-17 VITALS
RESPIRATION RATE: 18 BRPM | BODY MASS INDEX: 41.77 KG/M2 | TEMPERATURE: 98.2 F | OXYGEN SATURATION: 91 % | DIASTOLIC BLOOD PRESSURE: 74 MMHG | HEIGHT: 68 IN | WEIGHT: 275.57 LBS | HEART RATE: 52 BPM | SYSTOLIC BLOOD PRESSURE: 151 MMHG

## 2018-06-17 PROCEDURE — 94799 UNLISTED PULMONARY SVC/PX: CPT

## 2018-06-17 PROCEDURE — 99232 SBSQ HOSP IP/OBS MODERATE 35: CPT | Performed by: INTERNAL MEDICINE

## 2018-06-17 RX ORDER — CLOPIDOGREL BISULFATE 75 MG/1
75 TABLET ORAL DAILY
Qty: 30 TABLET | Refills: 0 | Status: SHIPPED | OUTPATIENT
Start: 2018-06-18 | End: 2018-06-17

## 2018-06-17 RX ORDER — CLOPIDOGREL BISULFATE 75 MG/1
75 TABLET ORAL DAILY
Qty: 30 TABLET | Refills: 0 | Status: SHIPPED | OUTPATIENT
Start: 2018-06-18 | End: 2018-07-18

## 2018-06-17 RX ORDER — CARVEDILOL 12.5 MG/1
12.5 TABLET ORAL 2 TIMES DAILY WITH MEALS
Qty: 60 TABLET | Refills: 0 | Status: SHIPPED | OUTPATIENT
Start: 2018-06-17 | End: 2018-07-17

## 2018-06-17 RX ORDER — ATORVASTATIN CALCIUM 40 MG/1
40 TABLET, FILM COATED ORAL NIGHTLY
Qty: 30 TABLET | Refills: 0 | Status: SHIPPED | OUTPATIENT
Start: 2018-06-17 | End: 2018-07-17

## 2018-06-17 RX ORDER — ASPIRIN 81 MG/1
81 TABLET ORAL DAILY
Start: 2018-06-18

## 2018-06-17 RX ADMIN — ACETAMINOPHEN 650 MG: 325 TABLET, FILM COATED ORAL at 05:30

## 2018-06-17 RX ADMIN — CARVEDILOL 12.5 MG: 12.5 TABLET, FILM COATED ORAL at 09:54

## 2018-06-17 RX ADMIN — ASPIRIN 81 MG: 81 TABLET ORAL at 09:53

## 2018-06-17 RX ADMIN — AMLODIPINE BESYLATE 5 MG: 5 TABLET ORAL at 09:53

## 2018-06-17 RX ADMIN — LEVOTHYROXINE SODIUM 300 MCG: 150 TABLET ORAL at 09:53

## 2018-06-17 RX ADMIN — CLOPIDOGREL 75 MG: 75 TABLET, FILM COATED ORAL at 09:53

## 2018-06-17 RX ADMIN — FUROSEMIDE 40 MG: 40 TABLET ORAL at 09:53

## 2018-06-17 RX ADMIN — HYDRALAZINE HYDROCHLORIDE 100 MG: 50 TABLET, FILM COATED ORAL at 09:53

## 2018-06-17 RX ADMIN — Medication 1000 MCG: at 09:53

## 2018-06-17 RX ADMIN — SERTRALINE 50 MG: 50 TABLET, FILM COATED ORAL at 09:53

## 2018-06-17 RX ADMIN — LOSARTAN POTASSIUM 100 MG: 100 TABLET, FILM COATED ORAL at 09:53

## 2018-06-17 NOTE — PLAN OF CARE
Problem: Stroke (Ischemic) (Adult)  Goal: Signs and Symptoms of Listed Potential Problems Will be Absent, Minimized or Managed (Stroke)  Outcome: Ongoing (interventions implemented as appropriate)      Problem: Patient Care Overview  Goal: Plan of Care Review  Outcome: Ongoing (interventions implemented as appropriate)   06/17/18 0581   Coping/Psychosocial   Plan of Care Reviewed With patient   Plan of Care Review   Progress improving   OTHER   Outcome Summary Pt alert & oriented. VSS. NIH 0. Ambulated to the the bathroom x1 assist, with knee brace on and cane. Pt very stiff when walking. BP has remained stable this shift. Has c/o of low back pain twice this shift with PRN Tylenol given as orderd. No acute distress noted this shift. Will continue to monitor.       Problem: Fall Risk (Adult)  Goal: Absence of Fall  Outcome: Ongoing (interventions implemented as appropriate)      Problem: Skin Injury Risk (Adult)  Goal: Skin Health and Integrity  Outcome: Ongoing (interventions implemented as appropriate)

## 2018-06-17 NOTE — THERAPY DISCHARGE NOTE
Acute Care - Physical Therapy Discharge Summary  [facility]       Patient Name: Beverly Clayton  : 1945  MRN: [id number]    Today's Date: 2018  Onset of Illness/Injury or Date of Surgery: 18    Date of Referral to PT: 18  Referring Physician: Dr Meza      Admit Date: 2018      PT Recommendation and Plan    Visit Dx:    ICD-10-CM ICD-9-CM   1. Transient cerebral ischemia, unspecified type G45.9 435.9   2. Elevated troponin R74.8 790.6             Outcome Measures     Row Name 18 1000 06/15/18 1039 06/15/18 1000       How much help from another person do you currently need...    Turning from your back to your side while in flat bed without using bedrails? 3  -RA  --  --    Moving from lying on back to sitting on the side of a flat bed without bedrails? 3  -RA  --  --    Moving to and from a bed to a chair (including a wheelchair)? 3  -RA  --  --    Standing up from a chair using your arms (e.g., wheelchair, bedside chair)? 3  -RA  --  --    Climbing 3-5 steps with a railing? 2  -RA  --  --    To walk in hospital room? 3  -RA  --  --    AM-PAC 6 Clicks Score 17  -RA  --  --       How much help from another is currently needed...    Putting on and taking off regular lower body clothing?  -- 2  -LE  --    Bathing (including washing, rinsing, and drying)  -- 3  -LE  --    Toileting (which includes using toilet bed pan or urinal)  -- 2  -LE  --    Putting on and taking off regular upper body clothing  -- 3  -LE  --    Taking care of personal grooming (such as brushing teeth)  -- 3  -LE  --    Eating meals  -- 4  -LE  --    Score  -- 17  -LE  --       Functional Assessment    Outcome Measure Options AM-PAC 6 Clicks Basic Mobility (PT)  -RA  -- AM-PAC 6 Clicks Daily Activity (OT)  -LE      User Key  (r) = Recorded By, (t) = Taken By, (c) = Cosigned By    Initials Name Provider Type    LUIS FELIPE Harris, PT Physical Therapist    BHARATH Quach, OTR Occupational Therapist             Therapy Suggested Charges     Code   Minutes Charges    93659 (CPT®) Hc Pt Neuromusc Re Education Ea 15 Min      12321 (CPT®) Hc Pt Ther Proc Ea 15 Min 8 1    99701 (CPT®) Hc Gait Training Ea 15 Min      91631 (CPT®) Hc Pt Therapeutic Act Ea 15 Min      43797 (CPT®) Hc Pt Manual Therapy Ea 15 Min      77245 (CPT®) Hc Pt Iontophoresis Ea 15 Min      48770 (CPT®) Hc Pt Elec Stim Ea-Per 15 Min      63336 (CPT®) Hc Pt Ultrasound Ea 15 Min      72125 (CPT®) Hc Pt Self Care/Mgmt/Train Ea 15 Min      Total  8 1                PT Rehab Goals     Row Name 06/17/18 1300             Bed Mobility Goal 1 (PT)    Activity/Assistive Device (Bed Mobility Goal 1, PT) sit to supine/supine to sit  -RA      [place] Level/Cues Needed (Bed Mobility Goal 1, PT) contact guard assist;standby assist  -RA      Time Frame (Bed Mobility Goal 1, PT) 5 days  -RA      Progress/Outcomes (Bed Mobility Goal 1, PT) goal not met  -RA         Transfer Goal 1 (PT)    Activity/Assistive Device (Transfer Goal 1, PT) transfers, all  -RA      [place] Level/Cues Needed (Transfer Goal 1, PT) supervision required  -RA      Time Frame (Transfer Goal 1, PT) 5 days  -RA      Progress/Outcome (Transfer Goal 1, PT) goal met  -RA         Gait Training Goal 1 (PT)    Activity/Assistive Device (Gait Training Goal 1, PT) gait (walking locomotion);assistive device use;cane, straight  -RA      [place] Level (Gait Training Goal 1, PT) supervision required  -RA      Distance (Gait Goal 1, PT) 20  -RA      Time Frame (Gait Training Goal 1, PT) 5 days  -RA      Progress/Outcome (Gait Training Goal 1, PT) goal partially met   supervision, longer distance not met d/t D/C   -RA         Stairs Goal 1 (PT)    Activity/Assistive Device (Stairs Goal 1, PT) stairs, all skills;cane, straight;using handrail, left;using handrail, right   using HR/cane as appropriate  -RA      [place] Level/Cues Needed (Stairs Goal 1, PT) contact guard assist;standby assist   -RA      Number of Stairs (Stairs Goal 1, PT) 4  -RA      Time Frame (Stairs Goal 1, PT) 5 days  -RA         Patient Education Goal (PT)    Activity (Patient Education Goal, PT) LE exercises   -RA      [place]/Cues/Accuracy (Memory Goal 2, PT) independent;demonstrates adequately  -RA      Time Frame (Patient Education Goal, PT) 5 days  -RA        User Key  (r) = Recorded By, (t) = Taken By, (c) = Cosigned By    Initials Name Provider Type Discipline    RA Candy Harris, PT Physical Therapist PT          Therapy Charges for Today     Code Description Service Date Service Provider Modifiers Qty    [id number]  PT THER PROC EA 15 MIN 6/16/2018 Candy Harris, PT GP 1    [id number]  PT EVAL LOW COMPLEXITY 2 6/16/2018 Candy Harris, PT GP 1          PT Discharge Summary  Anticipated Discharge Disposition (PT): home  Reason for Discharge: Discharge from facility  Outcomes Achieved: Patient able to partially acheive established goals  Discharge Destination: Home      Candy Harris PT   6/17/2018

## 2018-06-17 NOTE — DISCHARGE SUMMARY
Date of Admission: 6/14/2018  Date of Discharge:  6/17/2018  Primary Care Physician: Provider Not In System     Discharge Diagnosis:  Principal Problem:    Transient cerebral ischemia  Active Problems:    Troponin level elevated    Localized edema    Chronic heart failure    Essential hypertension    Hypothyroidism      DETAILS OF HOSPITAL STAY     Pertinent Test Results and Procedures Performed    Head CT on 6/14/18:  No evidence of acute infarction or hemorrhage. There is an  area of decreased attenuation involving the periventricular white matter  of the right frontal lobe posteriorly and superiorly suggesting a remote  infarct. Moderate vascular calcification is noted. No convincing acute  infarction is identified. There is no evidence of hemorrhage. The above  information was called to and discussed with Dr. Hernandez.    Bilateral lower extremity Doppler:  · There was deep venous valvular incompetence noted in the right mid   femoral and popliteal.  · There was superficial venous valvular incompetence noted in the left   saphenofemoral junction.  · All other veins appeared normal bilaterally.    Brain MRI:  1. Atrophy with chronic-appearing changes as discussed. No acute finding  or abnormal enhancement       MRA of the head:  1. Limited but grossly unremarkable exam. Follow-up as above if  indicated    MRA of the neck:  1.  Mild bifurcation region plaque, right greater than left, but without  significant stenosis.  2. Areas of stenosis of the proximal left vertebral as discussed.    Bilateral carotid artery ultrasound:  · Proximal right internal carotid artery mild stenosis.  · Proximal left internal carotid artery mild stenosis.  · Mid left internal carotid artery is normal.  · Distal left internal carotid artery is normal.  · All other left side carotid system vessels are normal.  · Mid right internal carotid artery is normal.  · Distal right internal carotid artery is normal.  · All other right side  carotid system vessels are normal.    Transthoracic echocardiogram:  · Left ventricular systolic function is hyperdynamic (EF > 70%).  · Left ventricular wall thickness is consistent with mild concentric hypertrophy  · Left ventricular diastolic dysfunction is noted (grade I) consistent with impaired relaxation.  · Normal right ventricular cavity size and systolic function noted.  · Left atrial cavity size is moderately dilated.  · Saline test results are negative.  · There is moderate nodular calcification of the mitral valve leaflets (aortic sclerosis).  · Mild tricuspid valve regurgitation is present.  · Calculated right ventricular systolic pressure from tricuspid regurgitation is 38 mmHg.  · There is no evidence of pericardial effusion.    Hospital Course    This is a 72-year-old male who presented to the emergency room with complaints of left sided face, arm, and hand numbness.  Please see H&P for full details of admission.  His symptoms resolved quickly and he was suspected to have a TIA.  He was admitted and underwent neurologic workup as outlined above.  He was evaluated by neurology who confirmed that they felt he had had a right hemispheric TIA.  There is also some concern for small right hemispheric embolic CVA and recrudescence of old CVA from uncontrolled hypertension.  The patient's blood pressure was initially in the 200s systolic which required control with a Cardene drip.  He was started on dual antiplatelet therapy with Plavix and aspirin.  He will continue this for the next 30 days and then changed to Plavix alone.  He was evaluated by cardiology who assisted with blood pressure management but did not feel that he needed any other cardiac workup other than once outlined above.  He was also evaluated by vascular surgery who obtained a carotid ultrasound that showed less than 50% stenosis and did not feel there is any surgical intervention performed at this time.  The patient's blood pressure is  much improved after reinstitution of his oral hypertensive agents as well as titration of these.  He is off of the Cardene drip.  He was also found to have profound hypothyroidism.  This was all discussed with the patient does admit that he has been noncompliant with his medications at home.  I have had several discussions with him stressing the importance of compliance moving forward.  He has been restarted on his Synthroid and I have strongly suggested that he return to his home town of [place], [place] as soon as possible to revisit his primary care physician for further management of this and his blood pressure.  I also suggested that he go back to see his cardiologist in the next 1-2 weeks.  Neurology recommends that once he returns to [place] he have prolonged outpatient cardiac monitoring for other etiologies of his stroke/TIA.  The patient is medically stable at this time and has been cleared for discharge by all consulting services.  He is in agreement with plans for discharge and expresses clear understanding of his need for that her compliance forward.    Physical Exam at Discharge:  General: No acute distress, AAOx3  HEENT: EOMI, PERRL  Cardiovascular: +s1 and s2, RRR  Lungs: No rhonchi or wheezing  Abdomen: soft, nontender    Consults:   Consults     Date and Time Order Name Status Description    6/15/2018 1209 Inpatient Vascular Surgery Consult Completed     6/15/2018 1209 Inpatient Cardiology Consult      6/14/2018 1724 Inpatient Cardiology Consult Completed     6/14/2018 1724 Inpatient Neurology Consult Completed     6/14/2018 1310 [facility] (on-call MD unless specified) Completed             Condition on Discharge: Stable    Discharge Disposition  Home or Self Care    Discharge Medications     Discharge Medications      New Medications      Instructions Start Date   aspirin 81 MG EC tablet   81 mg, Oral, Daily   Start Date:  6/18/2018     atorvastatin 40 MG tablet  Commonly known as:  LIPITOR   40 mg,  Oral, Nightly      clopidogrel 75 MG tablet  Commonly known as:  PLAVIX   75 mg, Oral, Daily   Start Date:  6/18/2018     cyanocobalamin 1000 MCG tablet  Commonly known as:  VITAMIN B-12   1,000 mcg, Oral, Daily   Start Date:  6/18/2018        Changes to Medications      Instructions Start Date   carvedilol 12.5 MG tablet  Commonly known as:  COREG  What changed:  · medication strength  · how much to take   12.5 mg, Oral, 2 Times Daily With Meals         Continue These Medications      Instructions Start Date   ALPRAZolam 0.5 MG tablet  Commonly known as:  XANAX   0.5 mg, Oral, Nightly PRN      amLODIPine 5 MG tablet  Commonly known as:  NORVASC   5 mg, Oral, Daily      furosemide 40 MG tablet  Commonly known as:  LASIX   40 mg, Oral, 2 Times Daily      hydrALAZINE 100 MG tablet  Commonly known as:  APRESOLINE   100 mg, Oral, 2 Times Daily      levothyroxine 300 MCG tablet  Commonly known as:  SYNTHROID, LEVOTHROID   300 mcg, Oral, Daily      losartan 100 MG tablet  Commonly known as:  COZAAR   100 mg, Oral, Daily      PROAIR  (90 Base) MCG/ACT inhaler  Generic drug:  albuterol   2 puffs, Inhalation, Every 4 Hours PRN      sertraline 50 MG tablet  Commonly known as:  ZOLOFT   50 mg, Oral, Daily      traZODone 50 MG tablet  Commonly known as:  DESYREL   50 mg, Oral, Nightly         Stop These Medications    traMADol 50 MG tablet  Commonly known as:  ULTRAM            Discharge Diet:   Diet Instructions     Diet: Regular, Cardiac; Thin       Discharge Diet:   Regular  Cardiac       Fluid Consistency:  Thin          Activity at Discharge:   Activity Instructions     Activity as Tolerated             Follow-up Appointments  No future appointments.  Additional Instructions for the Follow-ups that You Need to Schedule     Discharge Follow-up with PCP    As directed      Follow Up Details:  1 week         Discharge Follow-up with Specified Provider: Primary cardiologist in [place] in 2 weeks    As directed      To:   Primary cardiologist in [place] in 2 weeks                 I have examined and discussed discharge planning with the patient today.     Tushar Meza MD  06/17/18  10:34 AM    Time: Discharge greater than 30 min

## 2018-06-17 NOTE — PROGRESS NOTES
Hospital Follow Up    LOS:  LOS: 3 days   Patient Name: Beverly Clayton  Age/Sex: 72 y.o. male  : 1945  MRN: [id number]    Day of Service: 18   Length of Stay: 3  Encounter Provider: Maciel Maria MD  Place of Service: [facility] CARDIOLOGY  Patient Care Team:  Provider Not In System as PCP - General    Subjective:     Chief Complaint: Hypertension    Interval History: Patient doing well no complaints.    Objective:     Objective:  Temp:  [97.6 °F (36.4 °C)-98.2 °F (36.8 °C)] 97.6 °F (36.4 °C)  Heart Rate:  [47-57] 52  Resp:  [18-20] 20  BP: (123-189)/(59-92) 152/78     Intake/Output Summary (Last 24 hours) at 18 0936  Last data filed at 18 0534   Gross per 24 hour   Intake              480 ml   Output             1075 ml   Net             -595 ml     Body mass index is 41.91 kg/m².  1    06/15/18  0534 18  0500 18  0534   Weight: 124 kg (272 lb 11.3 oz) 128 kg (282 lb 3 oz) 125 kg (275 lb 9.2 oz)     Weight change: -3 kg (-6 lb 9.8 oz)      Physical Exam:   General : Alert, cooperative, in no acute distress.  Neuro: alert,cooperative and oriented  Lungs: CTAB. Normal respiratory effort and rate.  CV:: Regular rate and rhythm, normal S1 and S2, no murmurs, gallops or rubs.  ABD: Soft, nontender, non-distended. positive bowel sounds  Extr: No edema or cyanosis, moves all extremities    Lab Review:     Results from last 7 days  Lab Units 18  0623 06/15/18  0603 18  1047   SODIUM mmol/L 141 141 143   POTASSIUM mmol/L 3.9 3.9 3.9   CHLORIDE mmol/L 100 101 102   CO2 mmol/L 30.5* 30.7* 28.3   BUN mg/dL 14 15 16   CREATININE mg/dL 0.93 1.01 1.19   GLUCOSE mg/dL 104* 100* 107*   CALCIUM mg/dL 9.2 9.3 9.3   AST (SGOT) U/L  --  14 15   ALT (SGPT) U/L  --  12 16       Results from last 7 days  Lab Units 06/15/18  0603 18  2352 18  1905 18  1047   TROPONIN T ng/mL 0.020 0.025 0.032* 0.035*       Results from last 7  days  Lab Units 06/16/18  0623 06/15/18  0603   WBC 10*3/mm3 6.78 6.37   HEMOGLOBIN g/dL 14.9 14.8   HEMATOCRIT % 46.0 45.9   PLATELETS 10*3/mm3 169 150       Results from last 7 days  Lab Units 06/15/18  0603 06/14/18  1047   INR  0.98 0.86*           Results from last 7 days  Lab Units 06/15/18  0603   CHOLESTEROL mg/dL 218*   TRIGLYCERIDES mg/dL 135   HDL CHOL mg/dL 54           Results from last 7 days  Lab Units 06/15/18  0603   TSH mIU/mL >100.000*     I reviewe  Current Medications:   Scheduled Meds:  amLODIPine 5 mg Oral Q24H   aspirin 81 mg Oral Daily   atorvastatin 40 mg Oral Nightly   carvedilol 12.5 mg Oral BID With Meals   clopidogrel 75 mg Oral Daily   furosemide 40 mg Oral Daily   hydrALAZINE 100 mg Oral Q12H   levothyroxine 300 mcg Oral Daily   losartan 100 mg Oral Q24H   sertraline 50 mg Oral Daily   traZODone 50 mg Oral Nightly   vitamin B-12 1,000 mcg Oral Daily     Continuous Infusions:  niCARdipine 5-15 mg/hr Last Rate: Stopped (06/16/18 0905)       Allergies:  Allergies   Allergen Reactions   • Penicillins Unknown (See Comments)     .       Assessment:     Principal Problem:    Transient cerebral ischemia  Active Problems:    Troponin level elevated    Localized edema    Chronic heart failure    Essential hypertension    Hypothyroidism        Plan:     1.  Hypertension overall blood pressure is significantly better.  Would follow for now.  2.  TIA  3.  Profound hypothyroidism with a TSH greater than 100.  4.  We'll follow patient significantly better okay to discharge from a cardiovascular standpoint       Maciel Maria MD  06/17/18  9:36 AM

## 2022-11-29 ENCOUNTER — OFFICE (OUTPATIENT)
Dept: URBAN - METROPOLITAN AREA CLINIC 9 | Facility: CLINIC | Age: 77
End: 2022-11-29

## 2022-11-29 VITALS
SYSTOLIC BLOOD PRESSURE: 102 MMHG | OXYGEN SATURATION: 94 % | WEIGHT: 212 LBS | HEIGHT: 70 IN | HEART RATE: 56 BPM | DIASTOLIC BLOOD PRESSURE: 67 MMHG

## 2022-11-29 DIAGNOSIS — K81.9 CHOLECYSTITIS, UNSPECIFIED: ICD-10-CM

## 2022-11-29 DIAGNOSIS — T85.518A: ICD-10-CM

## 2022-11-29 DIAGNOSIS — R63.4 ABNORMAL WEIGHT LOSS: ICD-10-CM

## 2022-11-29 DIAGNOSIS — D50.9 IRON DEFICIENCY ANEMIA, UNSPECIFIED: ICD-10-CM

## 2022-11-29 DIAGNOSIS — K21.00 GASTRO-ESOPHAGEAL REFLUX DISEASE WITH ESOPHAGITIS, WITHOUT B: ICD-10-CM

## 2022-11-29 PROCEDURE — 99204 OFFICE O/P NEW MOD 45 MIN: CPT | Performed by: NURSE PRACTITIONER

## 2022-11-29 RX ORDER — POLYETHYLENE GLYCOL 3350, SODIUM SULFATE, SODIUM CHLORIDE, POTASSIUM CHLORIDE, ASCORBIC ACID, SODIUM ASCORBATE 140-9-5.2G
KIT ORAL
Qty: 1 | Refills: 0 | Status: ACTIVE
Start: 2022-11-29

## 2022-11-29 RX ORDER — PANTOPRAZOLE SODIUM 40 MG/1
40 TABLET, DELAYED RELEASE ORAL
Qty: 90 | Refills: 3 | Status: ACTIVE
Start: 2022-11-29

## 2022-11-30 LAB
CBC, PLATELET, NO DIFFERENTIAL: HEMATOCRIT: 34.1 % — LOW (ref 37.5–51)
CBC, PLATELET, NO DIFFERENTIAL: HEMOGLOBIN: 11.2 G/DL — LOW (ref 13–17.7)
CBC, PLATELET, NO DIFFERENTIAL: MCH: 29 PG (ref 26.6–33)
CBC, PLATELET, NO DIFFERENTIAL: MCHC: 32.8 G/DL (ref 31.5–35.7)
CBC, PLATELET, NO DIFFERENTIAL: MCV: 88 FL (ref 79–97)
CBC, PLATELET, NO DIFFERENTIAL: NRBC: (no result)
CBC, PLATELET, NO DIFFERENTIAL: PLATELETS: 273 X10E3/UL (ref 150–450)
CBC, PLATELET, NO DIFFERENTIAL: RBC: 3.86 X10E6/UL — LOW (ref 4.14–5.8)
CBC, PLATELET, NO DIFFERENTIAL: RDW: 14.6 % (ref 11.6–15.4)
CBC, PLATELET, NO DIFFERENTIAL: WBC: 9.1 X10E3/UL (ref 3.4–10.8)
COMP. METABOLIC PANEL (14): A/G RATIO: 1.6 (ref 1.2–2.2)
COMP. METABOLIC PANEL (14): ALBUMIN: 3.8 G/DL (ref 3.7–4.7)
COMP. METABOLIC PANEL (14): ALKALINE PHOSPHATASE: 87 IU/L (ref 44–121)
COMP. METABOLIC PANEL (14): ALT (SGPT): 11 IU/L (ref 0–44)
COMP. METABOLIC PANEL (14): AST (SGOT): 11 IU/L (ref 0–40)
COMP. METABOLIC PANEL (14): BILIRUBIN, TOTAL: 0.5 MG/DL (ref 0–1.2)
COMP. METABOLIC PANEL (14): BUN/CREATININE RATIO: 18 (ref 10–24)
COMP. METABOLIC PANEL (14): BUN: 30 MG/DL — HIGH (ref 8–27)
COMP. METABOLIC PANEL (14): CALCIUM: 9 MG/DL (ref 8.6–10.2)
COMP. METABOLIC PANEL (14): CARBON DIOXIDE, TOTAL: 28 MMOL/L (ref 20–29)
COMP. METABOLIC PANEL (14): CHLORIDE: 101 MMOL/L (ref 96–106)
COMP. METABOLIC PANEL (14): CREATININE: 1.63 MG/DL — HIGH (ref 0.76–1.27)
COMP. METABOLIC PANEL (14): EGFR: 44 ML/MIN/1.73 — LOW (ref 59–?)
COMP. METABOLIC PANEL (14): GLOBULIN, TOTAL: 2.4 G/DL (ref 1.5–4.5)
COMP. METABOLIC PANEL (14): GLUCOSE: 116 MG/DL — HIGH (ref 70–99)
COMP. METABOLIC PANEL (14): POTASSIUM: 3.7 MMOL/L (ref 3.5–5.2)
COMP. METABOLIC PANEL (14): PROTEIN, TOTAL: 6.2 G/DL (ref 6–8.5)
COMP. METABOLIC PANEL (14): SODIUM: 142 MMOL/L (ref 134–144)
FE+TIBC+FER: FERRITIN: 163 NG/ML (ref 30–400)
FE+TIBC+FER: IRON BIND.CAP.(TIBC): 176 UG/DL — LOW (ref 250–450)
FE+TIBC+FER: IRON SATURATION: 20 % (ref 15–55)
FE+TIBC+FER: IRON: 36 UG/DL — LOW (ref 38–169)
FE+TIBC+FER: UIBC: 140 UG/DL (ref 111–343)
VITAMIN B12 AND FOLATE: FOLATE (FOLIC ACID), SERUM: 6.8 NG/ML (ref 3–?)
VITAMIN B12 AND FOLATE: VITAMIN B12: 350 PG/ML (ref 232–1245)